# Patient Record
Sex: MALE | Race: WHITE | NOT HISPANIC OR LATINO | Employment: OTHER | URBAN - METROPOLITAN AREA
[De-identification: names, ages, dates, MRNs, and addresses within clinical notes are randomized per-mention and may not be internally consistent; named-entity substitution may affect disease eponyms.]

---

## 2018-07-08 ENCOUNTER — LAB REQUISITION (OUTPATIENT)
Dept: LAB | Facility: HOSPITAL | Age: 83
End: 2018-07-08

## 2018-07-08 DIAGNOSIS — Z00.00 ROUTINE GENERAL MEDICAL EXAMINATION AT A HEALTH CARE FACILITY: ICD-10-CM

## 2018-07-08 LAB
ANION GAP SERPL CALCULATED.3IONS-SCNC: 10 MMOL/L (ref 3–11)
BASOPHILS # BLD AUTO: 0.05 10*3/MM3 (ref 0–0.2)
BASOPHILS NFR BLD AUTO: 0.2 % (ref 0–1)
BUN BLD-MCNC: 66 MG/DL (ref 9–23)
BUN/CREAT SERPL: 18.1 (ref 7–25)
CALCIUM SPEC-SCNC: 7 MG/DL (ref 8.7–10.4)
CHLORIDE SERPL-SCNC: 111 MMOL/L (ref 99–109)
CLUMPED PLATELETS: PRESENT
CO2 SERPL-SCNC: 18 MMOL/L (ref 20–31)
CREAT BLD-MCNC: 3.64 MG/DL (ref 0.6–1.3)
DEPRECATED RDW RBC AUTO: 48.4 FL (ref 37–54)
EOSINOPHIL # BLD AUTO: 0.03 10*3/MM3 (ref 0–0.3)
EOSINOPHIL NFR BLD AUTO: 0.1 % (ref 0–3)
ERYTHROCYTE [DISTWIDTH] IN BLOOD BY AUTOMATED COUNT: 15.8 % (ref 11.3–14.5)
GFR SERPL CREATININE-BSD FRML MDRD: 16 ML/MIN/1.73
GLUCOSE BLD-MCNC: 212 MG/DL (ref 70–100)
HCT VFR BLD AUTO: 16.3 % (ref 38.9–50.9)
HGB BLD-MCNC: 5.1 G/DL (ref 13.1–17.5)
IMM GRANULOCYTES # BLD: 0.25 10*3/MM3 (ref 0–0.03)
IMM GRANULOCYTES NFR BLD: 1.2 % (ref 0–0.6)
LARGE PLATELETS: NORMAL
LYMPHOCYTES # BLD AUTO: 1.63 10*3/MM3 (ref 0.6–4.8)
LYMPHOCYTES NFR BLD AUTO: 7.6 % (ref 24–44)
MCH RBC QN AUTO: 26.7 PG (ref 27–31)
MCHC RBC AUTO-ENTMCNC: 31.3 G/DL (ref 32–36)
MCV RBC AUTO: 85.3 FL (ref 80–99)
MONOCYTES # BLD AUTO: 1.68 10*3/MM3 (ref 0–1)
MONOCYTES NFR BLD AUTO: 7.8 % (ref 0–12)
NEUTROPHILS # BLD AUTO: 18.12 10*3/MM3 (ref 1.5–8.3)
NEUTROPHILS NFR BLD AUTO: 84.3 % (ref 41–71)
PLATELET # BLD AUTO: 257 10*3/MM3 (ref 150–450)
PMV BLD AUTO: 9.7 FL (ref 6–12)
POTASSIUM BLD-SCNC: 4.1 MMOL/L (ref 3.5–5.5)
RBC # BLD AUTO: 1.91 10*6/MM3 (ref 4.2–5.76)
RBC MORPH BLD: NORMAL
SODIUM BLD-SCNC: 139 MMOL/L (ref 132–146)
WBC MORPH BLD: NORMAL
WBC NRBC COR # BLD: 21.51 10*3/MM3 (ref 3.5–10.8)

## 2018-07-08 PROCEDURE — 80048 BASIC METABOLIC PNL TOTAL CA: CPT

## 2018-07-08 PROCEDURE — 85025 COMPLETE CBC W/AUTO DIFF WBC: CPT

## 2018-07-08 PROCEDURE — 85007 BL SMEAR W/DIFF WBC COUNT: CPT

## 2018-08-27 ENCOUNTER — LAB REQUISITION (OUTPATIENT)
Dept: LAB | Facility: HOSPITAL | Age: 83
End: 2018-08-27

## 2018-08-27 DIAGNOSIS — Z00.00 ROUTINE GENERAL MEDICAL EXAMINATION AT A HEALTH CARE FACILITY: ICD-10-CM

## 2018-08-27 PROCEDURE — 85025 COMPLETE CBC W/AUTO DIFF WBC: CPT

## 2018-08-27 PROCEDURE — 80048 BASIC METABOLIC PNL TOTAL CA: CPT

## 2019-01-12 ENCOUNTER — HOSPITAL ENCOUNTER (INPATIENT)
Facility: HOSPITAL | Age: 84
LOS: 4 days | Discharge: HOME HEALTH | End: 2019-01-17
Attending: EMERGENCY MEDICINE | Admitting: INTERNAL MEDICINE

## 2019-01-12 ENCOUNTER — APPOINTMENT (OUTPATIENT)
Dept: CT IMAGING | Facility: HOSPITAL | Age: 84
End: 2019-01-12

## 2019-01-12 DIAGNOSIS — R50.9 ACUTE FEBRILE ILLNESS: ICD-10-CM

## 2019-01-12 DIAGNOSIS — Z78.9 IMPAIRED MOBILITY AND ADLS: ICD-10-CM

## 2019-01-12 DIAGNOSIS — Z74.09 IMPAIRED MOBILITY AND ADLS: ICD-10-CM

## 2019-01-12 DIAGNOSIS — Z74.09 IMPAIRED FUNCTIONAL MOBILITY, BALANCE, GAIT, AND ENDURANCE: ICD-10-CM

## 2019-01-12 DIAGNOSIS — K59.00 CONSTIPATION, UNSPECIFIED CONSTIPATION TYPE: ICD-10-CM

## 2019-01-12 DIAGNOSIS — L89.623 PRESSURE INJURY OF LEFT HEEL, STAGE 3 (HCC): ICD-10-CM

## 2019-01-12 DIAGNOSIS — N39.0 SEPSIS DUE TO URINARY TRACT INFECTION (HCC): Primary | ICD-10-CM

## 2019-01-12 DIAGNOSIS — R18.8 RETROPERITONEAL FLUID COLLECTION: ICD-10-CM

## 2019-01-12 DIAGNOSIS — A41.9 SEPSIS DUE TO URINARY TRACT INFECTION (HCC): Primary | ICD-10-CM

## 2019-01-12 DIAGNOSIS — J90 PLEURAL EFFUSION, RIGHT: ICD-10-CM

## 2019-01-12 PROBLEM — N17.9 ACUTE RENAL FAILURE SUPERIMPOSED ON STAGE 3 CHRONIC KIDNEY DISEASE (HCC): Status: ACTIVE | Noted: 2019-01-12

## 2019-01-12 PROBLEM — T14.8XXD WOUND HEALING, DELAYED: Status: ACTIVE | Noted: 2019-01-12

## 2019-01-12 PROBLEM — E83.51 HYPOCALCEMIA: Status: ACTIVE | Noted: 2019-01-12

## 2019-01-12 PROBLEM — I10 ESSENTIAL HYPERTENSION: Status: ACTIVE | Noted: 2019-01-12

## 2019-01-12 PROBLEM — N17.9 AKI (ACUTE KIDNEY INJURY) (HCC): Status: ACTIVE | Noted: 2019-01-12

## 2019-01-12 PROBLEM — N18.30 ACUTE RENAL FAILURE SUPERIMPOSED ON STAGE 3 CHRONIC KIDNEY DISEASE (HCC): Status: ACTIVE | Noted: 2019-01-12

## 2019-01-12 PROBLEM — E03.9 HYPOTHYROIDISM (ACQUIRED): Status: ACTIVE | Noted: 2019-01-12

## 2019-01-12 PROBLEM — R65.10 SIRS (SYSTEMIC INFLAMMATORY RESPONSE SYNDROME) (HCC): Status: ACTIVE | Noted: 2019-01-12

## 2019-01-12 PROBLEM — E11.9 TYPE 2 DIABETES MELLITUS (HCC): Status: ACTIVE | Noted: 2019-01-12

## 2019-01-12 LAB
ALBUMIN SERPL-MCNC: 3.26 G/DL (ref 3.2–4.8)
ALBUMIN/GLOB SERPL: 1.2 G/DL (ref 1.5–2.5)
ALP SERPL-CCNC: 74 U/L (ref 25–100)
ALT SERPL W P-5'-P-CCNC: 10 U/L (ref 7–40)
ANION GAP SERPL CALCULATED.3IONS-SCNC: 10 MMOL/L (ref 3–11)
AST SERPL-CCNC: 13 U/L (ref 0–33)
BACTERIA UR QL AUTO: ABNORMAL /HPF
BASOPHILS # BLD AUTO: 0.03 10*3/MM3 (ref 0–0.2)
BASOPHILS NFR BLD AUTO: 0.2 % (ref 0–1)
BILIRUB SERPL-MCNC: 0.2 MG/DL (ref 0.3–1.2)
BILIRUB UR QL STRIP: NEGATIVE
BUN BLD-MCNC: 86 MG/DL (ref 9–23)
BUN/CREAT SERPL: 36.1 (ref 7–25)
CALCIUM SPEC-SCNC: 8.4 MG/DL (ref 8.7–10.4)
CHLORIDE SERPL-SCNC: 104 MMOL/L (ref 99–109)
CLARITY UR: ABNORMAL
CO2 SERPL-SCNC: 18 MMOL/L (ref 20–31)
COLOR UR: YELLOW
CREAT BLD-MCNC: 2.38 MG/DL (ref 0.6–1.3)
D-LACTATE SERPL-SCNC: 2.3 MMOL/L (ref 0.5–2)
DEPRECATED RDW RBC AUTO: 48.2 FL (ref 37–54)
EOSINOPHIL # BLD AUTO: 0 10*3/MM3 (ref 0–0.3)
EOSINOPHIL NFR BLD AUTO: 0 % (ref 0–3)
ERYTHROCYTE [DISTWIDTH] IN BLOOD BY AUTOMATED COUNT: 15.5 % (ref 11.3–14.5)
FLUAV SUBTYP SPEC NAA+PROBE: NOT DETECTED
FLUBV RNA ISLT QL NAA+PROBE: NOT DETECTED
GFR SERPL CREATININE-BSD FRML MDRD: 26 ML/MIN/1.73
GLOBULIN UR ELPH-MCNC: 2.6 GM/DL
GLUCOSE BLD-MCNC: 170 MG/DL (ref 70–100)
GLUCOSE UR STRIP-MCNC: NEGATIVE MG/DL
HCT VFR BLD AUTO: 32.3 % (ref 38.9–50.9)
HGB BLD-MCNC: 10.6 G/DL (ref 13.1–17.5)
HGB UR QL STRIP.AUTO: ABNORMAL
HOLD SPECIMEN: NORMAL
HOLD SPECIMEN: NORMAL
HYALINE CASTS UR QL AUTO: ABNORMAL /LPF
IMM GRANULOCYTES # BLD AUTO: 0.06 10*3/MM3 (ref 0–0.03)
IMM GRANULOCYTES NFR BLD AUTO: 0.3 % (ref 0–0.6)
KETONES UR QL STRIP: NEGATIVE
LEUKOCYTE ESTERASE UR QL STRIP.AUTO: ABNORMAL
LYMPHOCYTES # BLD AUTO: 0.25 10*3/MM3 (ref 0.6–4.8)
LYMPHOCYTES NFR BLD AUTO: 1.4 % (ref 24–44)
MCH RBC QN AUTO: 28 PG (ref 27–31)
MCHC RBC AUTO-ENTMCNC: 32.8 G/DL (ref 32–36)
MCV RBC AUTO: 85.2 FL (ref 80–99)
MONOCYTES # BLD AUTO: 0.18 10*3/MM3 (ref 0–1)
MONOCYTES NFR BLD AUTO: 1 % (ref 0–12)
NEUTROPHILS # BLD AUTO: 17.74 10*3/MM3 (ref 1.5–8.3)
NEUTROPHILS NFR BLD AUTO: 97.4 % (ref 41–71)
NITRITE UR QL STRIP: NEGATIVE
PH UR STRIP.AUTO: <=5 [PH] (ref 5–8)
PLAT MORPH BLD: NORMAL
PLATELET # BLD AUTO: 270 10*3/MM3 (ref 150–450)
PMV BLD AUTO: 9.2 FL (ref 6–12)
POTASSIUM BLD-SCNC: 3.8 MMOL/L (ref 3.5–5.5)
PROCALCITONIN SERPL-MCNC: 3.14 NG/ML
PROT SERPL-MCNC: 5.9 G/DL (ref 5.7–8.2)
PROT UR QL STRIP: ABNORMAL
RBC # BLD AUTO: 3.79 10*6/MM3 (ref 4.2–5.76)
RBC # UR: ABNORMAL /HPF
RBC MORPH BLD: NORMAL
REF LAB TEST METHOD: ABNORMAL
SODIUM BLD-SCNC: 132 MMOL/L (ref 132–146)
SP GR UR STRIP: 1.02 (ref 1–1.03)
SQUAMOUS #/AREA URNS HPF: ABNORMAL /HPF
UROBILINOGEN UR QL STRIP: ABNORMAL
WBC MORPH BLD: NORMAL
WBC NRBC COR # BLD: 18.2 10*3/MM3 (ref 3.5–10.8)
WBC UR QL AUTO: ABNORMAL /HPF
WHOLE BLOOD HOLD SPECIMEN: NORMAL
WHOLE BLOOD HOLD SPECIMEN: NORMAL
YEAST URNS QL MICRO: ABNORMAL /HPF

## 2019-01-12 PROCEDURE — 83605 ASSAY OF LACTIC ACID: CPT | Performed by: EMERGENCY MEDICINE

## 2019-01-12 PROCEDURE — P9612 CATHETERIZE FOR URINE SPEC: HCPCS

## 2019-01-12 PROCEDURE — 80053 COMPREHEN METABOLIC PANEL: CPT | Performed by: EMERGENCY MEDICINE

## 2019-01-12 PROCEDURE — 81001 URINALYSIS AUTO W/SCOPE: CPT | Performed by: EMERGENCY MEDICINE

## 2019-01-12 PROCEDURE — 25010000002 VANCOMYCIN 10 G RECONSTITUTED SOLUTION: Performed by: EMERGENCY MEDICINE

## 2019-01-12 PROCEDURE — 85025 COMPLETE CBC W/AUTO DIFF WBC: CPT | Performed by: EMERGENCY MEDICINE

## 2019-01-12 PROCEDURE — 99223 1ST HOSP IP/OBS HIGH 75: CPT | Performed by: INTERNAL MEDICINE

## 2019-01-12 PROCEDURE — 87502 INFLUENZA DNA AMP PROBE: CPT | Performed by: EMERGENCY MEDICINE

## 2019-01-12 PROCEDURE — 84145 PROCALCITONIN (PCT): CPT | Performed by: EMERGENCY MEDICINE

## 2019-01-12 PROCEDURE — 25010000002 ONDANSETRON PER 1 MG

## 2019-01-12 PROCEDURE — 87040 BLOOD CULTURE FOR BACTERIA: CPT | Performed by: EMERGENCY MEDICINE

## 2019-01-12 PROCEDURE — 36415 COLL VENOUS BLD VENIPUNCTURE: CPT | Performed by: EMERGENCY MEDICINE

## 2019-01-12 PROCEDURE — 25010000002 PIPERACILLIN SOD-TAZOBACTAM PER 1 G: Performed by: EMERGENCY MEDICINE

## 2019-01-12 PROCEDURE — 85007 BL SMEAR W/DIFF WBC COUNT: CPT | Performed by: EMERGENCY MEDICINE

## 2019-01-12 PROCEDURE — 87086 URINE CULTURE/COLONY COUNT: CPT | Performed by: EMERGENCY MEDICINE

## 2019-01-12 PROCEDURE — 99285 EMERGENCY DEPT VISIT HI MDM: CPT

## 2019-01-12 PROCEDURE — 74176 CT ABD & PELVIS W/O CONTRAST: CPT

## 2019-01-12 RX ORDER — SODIUM CHLORIDE 0.9 % (FLUSH) 0.9 %
10 SYRINGE (ML) INJECTION AS NEEDED
Status: DISCONTINUED | OUTPATIENT
Start: 2019-01-12 | End: 2019-01-17 | Stop reason: HOSPADM

## 2019-01-12 RX ORDER — ONDANSETRON 2 MG/ML
INJECTION INTRAMUSCULAR; INTRAVENOUS
Status: COMPLETED
Start: 2019-01-12 | End: 2019-01-12

## 2019-01-12 RX ORDER — LEVOTHYROXINE SODIUM 0.05 MG/1
50 TABLET ORAL DAILY
COMMUNITY

## 2019-01-12 RX ORDER — HYDROXYZINE HYDROCHLORIDE 25 MG/1
25 TABLET, FILM COATED ORAL 2 TIMES DAILY
COMMUNITY

## 2019-01-12 RX ORDER — PHENOL 1.4 %
AEROSOL, SPRAY (ML) MUCOUS MEMBRANE NIGHTLY
COMMUNITY

## 2019-01-12 RX ORDER — METRONIDAZOLE 500 MG/1
500 TABLET ORAL 3 TIMES DAILY
COMMUNITY
End: 2019-01-17 | Stop reason: HOSPADM

## 2019-01-12 RX ORDER — ACETAMINOPHEN 500 MG
1000 TABLET ORAL ONCE
Status: COMPLETED | OUTPATIENT
Start: 2019-01-12 | End: 2019-01-12

## 2019-01-12 RX ORDER — TAMSULOSIN HYDROCHLORIDE 0.4 MG/1
1 CAPSULE ORAL NIGHTLY
COMMUNITY

## 2019-01-12 RX ORDER — SULFAMETHOXAZOLE AND TRIMETHOPRIM 800; 160 MG/1; MG/1
1 TABLET ORAL 2 TIMES DAILY
COMMUNITY
End: 2019-01-17 | Stop reason: HOSPADM

## 2019-01-12 RX ORDER — ONDANSETRON 2 MG/ML
4 INJECTION INTRAMUSCULAR; INTRAVENOUS ONCE
Status: COMPLETED | OUTPATIENT
Start: 2019-01-12 | End: 2019-01-12

## 2019-01-12 RX ORDER — NIFEDIPINE 20 MG/1
20 CAPSULE ORAL DAILY
COMMUNITY
End: 2019-01-17 | Stop reason: HOSPADM

## 2019-01-12 RX ADMIN — TAZOBACTAM SODIUM AND PIPERACILLIN SODIUM 3.38 G: 375; 3 INJECTION, SOLUTION INTRAVENOUS at 19:22

## 2019-01-12 RX ADMIN — ONDANSETRON 4 MG: 2 INJECTION INTRAMUSCULAR; INTRAVENOUS at 19:14

## 2019-01-12 RX ADMIN — VANCOMYCIN HYDROCHLORIDE 2250 MG: 10 INJECTION, POWDER, LYOPHILIZED, FOR SOLUTION INTRAVENOUS at 20:36

## 2019-01-12 RX ADMIN — SODIUM CHLORIDE, POTASSIUM CHLORIDE, SODIUM LACTATE AND CALCIUM CHLORIDE 2328 ML: 600; 310; 30; 20 INJECTION, SOLUTION INTRAVENOUS at 20:39

## 2019-01-12 RX ADMIN — ACETAMINOPHEN 1000 MG: 500 TABLET, FILM COATED ORAL at 19:12

## 2019-01-12 NOTE — ED PROVIDER NOTES
"Subjective   Leo Elias is a 83 y.o. male who presents to the emergency department with complaints of fever of 103 that occurred today. Wife mentions that the patient had 3 episodes of vomiting since 0300 that appeared dark in color. The patient has abdominal pain. The patient has not had a bowel movement in 2 weeks, the patients wife gave him a suppository 1 hour ago that did not work. The patient has a wound on his left heel that started in 10/2018 home health is coming and taking care of his wound. He is currently taking Flagyl for his wound.The patient started Bactrim yesterday for cloudy urine. He has not had any Tylenol or Motrin for his fever. He has a PSHx of appendectomy. He denies any SOA, cough, congestion, and any other acute symptoms at this time.         History provided by:  Patient  Fever   Max temp prior to arrival:  103  Temp source:  Axillary  Severity:  Moderate  Onset quality:  Sudden  Duration:  1 day  Timing:  Constant  Progression:  Unchanged  Chronicity:  New  Relieved by:  Nothing  Worsened by:  Nothing  Ineffective treatments: Suppository   Associated symptoms: nausea and vomiting    Associated symptoms: no congestion, no cough and no diarrhea        Review of Systems   Constitutional: Positive for fever.   HENT: Negative for congestion.    Respiratory: Negative for cough and shortness of breath.    Gastrointestinal: Positive for nausea and vomiting. Negative for diarrhea.   Skin: Positive for wound.   All other systems reviewed and are negative.      Past Medical History:   Diagnosis Date   • Diabetes mellitus (CMS/HCC)    • Disease of thyroid gland    • Hypertension    • Renal disorder        No Known Allergies    Past Surgical History:   Procedure Laterality Date   • KNEE SURGERY      \"septic knee\"       History reviewed. No pertinent family history.    Social History     Socioeconomic History   • Marital status:      Spouse name: Not on file   • Number of children: Not on file "   • Years of education: Not on file   • Highest education level: Not on file   Tobacco Use   • Smoking status: Never Smoker   • Smokeless tobacco: Never Used   Substance and Sexual Activity   • Alcohol use: No     Frequency: Never         Objective   Physical Exam   Constitutional: He is oriented to person, place, and time. He appears well-developed and well-nourished. No distress.   HENT:   Head: Normocephalic and atraumatic.   Eyes: Conjunctivae are normal. No scleral icterus.   Neck: Normal range of motion. Neck supple.   Cardiovascular: Regular rhythm, normal heart sounds and intact distal pulses.   The patient has a irregular heart rate.    Pulmonary/Chest: Effort normal and breath sounds normal. No respiratory distress. He has no wheezes. He has no rales.   Abdominal: Soft. There is no tenderness. There is no guarding.   The patient has hypoactive bowel sounds    Musculoskeletal: Normal range of motion.   Neurological: He is alert and oriented to person, place, and time.   Skin: Skin is warm and dry. He is not diaphoretic.   Psychiatric: He has a normal mood and affect. His behavior is normal.   Nursing note and vitals reviewed.      Critical Care  Performed by: Jacky Dubois MD  Authorized by: Jacky Dubois MD     Critical care provider statement:     Critical care time (minutes):  60    Critical care time was exclusive of:  Separately billable procedures and treating other patients    Critical care was necessary to treat or prevent imminent or life-threatening deterioration of the following conditions:  Sepsis    Critical care was time spent personally by me on the following activities:  Discussions with consultants, development of treatment plan with patient or surrogate, examination of patient, review of old charts, ordering and review of laboratory studies, ordering and review of radiographic studies, ordering and performing treatments and interventions, re-evaluation of patient's  condition, obtaining history from patient or surrogate, pulse oximetry and evaluation of patient's response to treatment               ED Course  ED Course as of Jan 12 2344   Sat Jan 12, 2019 1842 Temp: (!) 103 °F (39.4 °C) [RS]   1842 Heart Rate: 119 [RS]   1926 Lactate: (!!) 2.3 [RS]   1926 Creatinine: (!) 2.38 [RS]   1926 WBC: (!) 18.20 [RS]   1926 Procalcitonin: (!) 3.14 [RS]   1956 Leuk Esterase, UA: (!) Large (3+) [RS]   1956 WBC, UA: (!) Too Numerous to Count [RS]   1956 Bacteria, UA: (!) 2+ [RS]   2159 Patient has numerous findings on imaging scan.  Patient has sepsis likely related to urinary tract infection as well as this unexplained fluid collection the retroperitoneal region.  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  He also has renal insufficiency.  Patient's long-term prognosis is very poor.  We will admit for further management.  I discussed these findings with the patient and wife.  They voice understanding and agree.  Hospitalist paged for admission.  [RS]   2232 Case discussed with the hospitalist agrees with plan for admission.  [RS]      ED Course User Index  [RS] Jacky Dubois MD     Recent Results (from the past 24 hour(s))   Comprehensive Metabolic Panel    Collection Time: 01/12/19  6:27 PM   Result Value Ref Range    Glucose 170 (H) 70 - 100 mg/dL    BUN 86 (H) 9 - 23 mg/dL    Creatinine 2.38 (H) 0.60 - 1.30 mg/dL    Sodium 132 132 - 146 mmol/L    Potassium 3.8 3.5 - 5.5 mmol/L    Chloride 104 99 - 109 mmol/L    CO2 18.0 (L) 20.0 - 31.0 mmol/L    Calcium 8.4 (L) 8.7 - 10.4 mg/dL    Total Protein 5.9 5.7 - 8.2 g/dL    Albumin 3.26 3.20 - 4.80 g/dL    ALT (SGPT) 10 7 - 40 U/L    AST (SGOT) 13 0 - 33 U/L    Alkaline Phosphatase 74 25 - 100 U/L    Total Bilirubin 0.2 (L) 0.3 - 1.2 mg/dL    eGFR Non African Amer 26 (L) >60 mL/min/1.73    Globulin 2.6 gm/dL    A/G Ratio 1.2 (L) 1.5 - 2.5 g/dL    BUN/Creatinine Ratio 36.1 (H) 7.0 - 25.0    Anion Gap 10.0 3.0 - 11.0 mmol/L    Lactic Acid, Plasma    Collection Time: 01/12/19  6:27 PM   Result Value Ref Range    Lactate 2.3 (C) 0.5 - 2.0 mmol/L   Procalcitonin    Collection Time: 01/12/19  6:27 PM   Result Value Ref Range    Procalcitonin 3.14 (H) <=0.25 ng/mL   CBC Auto Differential    Collection Time: 01/12/19  6:27 PM   Result Value Ref Range    WBC 18.20 (H) 3.50 - 10.80 10*3/mm3    RBC 3.79 (L) 4.20 - 5.76 10*6/mm3    Hemoglobin 10.6 (L) 13.1 - 17.5 g/dL    Hematocrit 32.3 (L) 38.9 - 50.9 %    MCV 85.2 80.0 - 99.0 fL    MCH 28.0 27.0 - 31.0 pg    MCHC 32.8 32.0 - 36.0 g/dL    RDW 15.5 (H) 11.3 - 14.5 %    RDW-SD 48.2 37.0 - 54.0 fl    MPV 9.2 6.0 - 12.0 fL    Platelets 270 150 - 450 10*3/mm3    Neutrophil % 97.4 (H) 41.0 - 71.0 %    Lymphocyte % 1.4 (L) 24.0 - 44.0 %    Monocyte % 1.0 0.0 - 12.0 %    Eosinophil % 0.0 0.0 - 3.0 %    Basophil % 0.2 0.0 - 1.0 %    Immature Grans % 0.3 0.0 - 0.6 %    Neutrophils, Absolute 17.74 (H) 1.50 - 8.30 10*3/mm3    Lymphocytes, Absolute 0.25 (L) 0.60 - 4.80 10*3/mm3    Monocytes, Absolute 0.18 0.00 - 1.00 10*3/mm3    Eosinophils, Absolute 0.00 0.00 - 0.30 10*3/mm3    Basophils, Absolute 0.03 0.00 - 0.20 10*3/mm3    Immature Grans, Absolute 0.06 (H) 0.00 - 0.03 10*3/mm3   Light Blue Top    Collection Time: 01/12/19  6:27 PM   Result Value Ref Range    Extra Tube hold for add-on    Green Top (Gel)    Collection Time: 01/12/19  6:27 PM   Result Value Ref Range    Extra Tube Hold for add-ons.    Lavender Top    Collection Time: 01/12/19  6:27 PM   Result Value Ref Range    Extra Tube hold for add-on    Lactic Acid, Reflex Timer (This will reflex a repeat order 3-3:15 hours after ordered.)    Collection Time: 01/12/19  6:27 PM   Result Value Ref Range    Extra Tube Hold for add-ons.    Scan Slide    Collection Time: 01/12/19  6:27 PM   Result Value Ref Range    RBC Morphology Normal Normal    WBC Morphology Normal Normal    Platelet Morphology Normal Normal   Influenza A & B, RT PCR - Swab,  Nasopharynx    Collection Time: 01/12/19  6:42 PM   Result Value Ref Range    Influenza A PCR Not Detected Not Detected    Influenza B PCR Not Detected Not Detected   Urinalysis With Culture If Indicated - Urine, Catheter In/Out    Collection Time: 01/12/19  7:21 PM   Result Value Ref Range    Color, UA Yellow Yellow, Straw    Appearance, UA Turbid (A) Clear    pH, UA <=5.0 5.0 - 8.0    Specific Gravity, UA 1.016 1.001 - 1.030    Glucose, UA Negative Negative    Ketones, UA Negative Negative    Bilirubin, UA Negative Negative    Blood, UA Small (1+) (A) Negative    Protein,  mg/dL (2+) (A) Negative    Leuk Esterase, UA Large (3+) (A) Negative    Nitrite, UA Negative Negative    Urobilinogen, UA 0.2 E.U./dL 0.2 - 1.0 E.U./dL   Urinalysis, Microscopic Only - Urine, Catheter In/Out    Collection Time: 01/12/19  7:21 PM   Result Value Ref Range    RBC, UA 0-2 None Seen, 0-2 /HPF    WBC, UA Too Numerous to Count (A) None Seen, 0-2 /HPF    Bacteria, UA 2+ (A) None Seen, Trace /HPF    Squamous Epithelial Cells, UA 0-2 None Seen, 0-2 /HPF    Yeast, UA Moderate/2+ Budding Yeast None Seen /HPF    Hyaline Casts, UA None Seen 0 - 6 /LPF    Methodology Manual Light Microscopy      Note: In addition to lab results from this visit, the labs listed above may include labs taken at another facility or during a different encounter within the last 24 hours. Please correlate lab times with ED admission and discharge times for further clarification of the services performed during this visit.    CT Abdomen Pelvis Without Contrast   Final Result   Addendum 1 of 1   Note: Critical Findings were discussed with Jacky Roy at 1/12/2019    9:49    PM EST. The report was understood and acknowledged by the healthcare    giver, who    stated patient has elevated WBCs, no history of recent fall or    anticoagulants.      THIS DOCUMENT HAS BEEN ELECTRONICALLY SIGNED BY HOLDEN BACON MD      Final   1. CAD.    2. Moderate right  pleural effusion.    3. Bibasilar subsegmental atelectasis. Mild RLL compressive atelectasis and/or    infiltrate.    4. Constipation with mild rectal stercoral colitis.    5. Prostate enlargement.    6. Small hiatal hernia.    7. Pancreatic atrophy.    8. Nonspecific bowel gas pattern.    9. Right retroperitoneal 3.9 cm x 6.7 cm x 9.1 cm oval abnormality, possible    fluid collection such as hematoma or abscess. See above.    10. Mild anasarca. Slightly more prominent edema or contusion in right flank    subcutaneous fat.    11.  Suspect mild swelling of the right psoas muscle.   12.  Additional findings, as above.       THIS DOCUMENT HAS BEEN ELECTRONICALLY SIGNED BY HOLDEN BACON MD        Vitals:    01/12/19 2200 01/12/19 2201 01/12/19 2300 01/12/19 2326   BP: 122/59  103/54    Pulse:  75 76    Resp:       Temp:    99.8 °F (37.7 °C)   TempSrc:    Oral   SpO2:  96% 96%    Weight:       Height:         Medications   sodium chloride 0.9 % flush 10 mL (not administered)   piperacillin-tazobactam (ZOSYN) 3.375 g in iso-osmotic dextrose 50 ml (premix) (0 g Intravenous Stopped 1/12/19 2033)   vancomycin 2250 mg/500 mL 0.9% NS IVPB ([facility]) (0 mg Intravenous Stopped 1/12/19 2305)   acetaminophen (TYLENOL) tablet 1,000 mg (1,000 mg Oral Given 1/12/19 1912)   ondansetron (ZOFRAN) injection 4 mg (4 mg Intravenous Given 1/12/19 1914)   lactated ringers - IBW for BMI > 30 bolus 2,328 mL (2,328 mL Intravenous New Bag 1/12/19 2039)     ECG/EMG Results (last 24 hours)     ** No results found for the last 24 hours. **                        MDM  Number of Diagnoses or Management Options  Acute febrile illness:   Constipation, unspecified constipation type:   Pleural effusion, right:   Pressure injury of left heel, stage 3 (CMS/HCC):   Retroperitoneal fluid collection:   Sepsis due to urinary tract infection (CMS/HCC):      Amount and/or Complexity of Data Reviewed  Clinical lab tests: reviewed  Tests in the radiology section of  CPT®: reviewed  Decide to obtain previous medical records or to obtain history from someone other than the patient: yes  Obtain history from someone other than the patient: yes  Review and summarize past medical records: yes  Discuss the patient with other providers: yes  Independent visualization of images, tracings, or specimens: yes    Critical Care  Total time providing critical care: 30-74 minutes      Final diagnoses:   Sepsis due to urinary tract infection (CMS/HCC)   Retroperitoneal fluid collection   Pleural effusion, right   Pressure injury of left heel, stage 3 (CMS/HCC)   Constipation, unspecified constipation type   Acute febrile illness       Documentation assistance provided by don Lopez.  Information recorded by the scribe was done at my direction and has been verified and validated by me.     Karen Lopez  01/12/19 6709       Karen Lopez  01/12/19 1669       Jacky Dubois MD  01/12/19 2326

## 2019-01-13 ENCOUNTER — APPOINTMENT (OUTPATIENT)
Dept: CT IMAGING | Facility: HOSPITAL | Age: 84
End: 2019-01-13

## 2019-01-13 LAB
ANION GAP SERPL CALCULATED.3IONS-SCNC: 9 MMOL/L (ref 3–11)
BASOPHILS # BLD AUTO: 0.02 10*3/MM3 (ref 0–0.2)
BASOPHILS NFR BLD AUTO: 0.2 % (ref 0–1)
BUN BLD-MCNC: 80 MG/DL (ref 9–23)
BUN/CREAT SERPL: 34.6 (ref 7–25)
CALCIUM SPEC-SCNC: 8.2 MG/DL (ref 8.7–10.4)
CHLORIDE SERPL-SCNC: 106 MMOL/L (ref 99–109)
CO2 SERPL-SCNC: 18 MMOL/L (ref 20–31)
CREAT BLD-MCNC: 2.31 MG/DL (ref 0.6–1.3)
CREAT UR-MCNC: 34.6 MG/DL
D-LACTATE SERPL-SCNC: 1.6 MMOL/L (ref 0.5–2)
DEPRECATED RDW RBC AUTO: 49.1 FL (ref 37–54)
EOSINOPHIL # BLD AUTO: 0.02 10*3/MM3 (ref 0–0.3)
EOSINOPHIL NFR BLD AUTO: 0.2 % (ref 0–3)
EOSINOPHIL SPEC QL MICRO: 0 % EOS/100 CELLS (ref 0–0)
ERYTHROCYTE [DISTWIDTH] IN BLOOD BY AUTOMATED COUNT: 15.7 % (ref 11.3–14.5)
GFR SERPL CREATININE-BSD FRML MDRD: 27 ML/MIN/1.73
GLUCOSE BLD-MCNC: 95 MG/DL (ref 70–100)
GLUCOSE BLDC GLUCOMTR-MCNC: 103 MG/DL (ref 70–130)
GLUCOSE BLDC GLUCOMTR-MCNC: 104 MG/DL (ref 70–130)
GLUCOSE BLDC GLUCOMTR-MCNC: 113 MG/DL (ref 70–130)
GLUCOSE BLDC GLUCOMTR-MCNC: 79 MG/DL (ref 70–130)
GLUCOSE BLDC GLUCOMTR-MCNC: 82 MG/DL (ref 70–130)
GLUCOSE BLDC GLUCOMTR-MCNC: 91 MG/DL (ref 70–130)
HBA1C MFR BLD: 5.6 % (ref 4.8–5.6)
HCT VFR BLD AUTO: 30.7 % (ref 38.9–50.9)
HGB BLD-MCNC: 10 G/DL (ref 13.1–17.5)
IMM GRANULOCYTES # BLD AUTO: 0.02 10*3/MM3 (ref 0–0.03)
IMM GRANULOCYTES NFR BLD AUTO: 0.2 % (ref 0–0.6)
LYMPHOCYTES # BLD AUTO: 0.21 10*3/MM3 (ref 0.6–4.8)
LYMPHOCYTES NFR BLD AUTO: 1.7 % (ref 24–44)
MCH RBC QN AUTO: 27.9 PG (ref 27–31)
MCHC RBC AUTO-ENTMCNC: 32.6 G/DL (ref 32–36)
MCV RBC AUTO: 85.5 FL (ref 80–99)
MONOCYTES # BLD AUTO: 0.17 10*3/MM3 (ref 0–1)
MONOCYTES NFR BLD AUTO: 1.4 % (ref 0–12)
NEUTROPHILS # BLD AUTO: 11.88 10*3/MM3 (ref 1.5–8.3)
NEUTROPHILS NFR BLD AUTO: 96.5 % (ref 41–71)
PLATELET # BLD AUTO: 221 10*3/MM3 (ref 150–450)
PMV BLD AUTO: 9.2 FL (ref 6–12)
POTASSIUM BLD-SCNC: 4 MMOL/L (ref 3.5–5.5)
PROT UR-MCNC: 105 MG/DL (ref 1–14)
RBC # BLD AUTO: 3.59 10*6/MM3 (ref 4.2–5.76)
SODIUM BLD-SCNC: 133 MMOL/L (ref 132–146)
SODIUM UR-SCNC: 24 MMOL/L (ref 30–90)
WBC NRBC COR # BLD: 12.3 10*3/MM3 (ref 3.5–10.8)

## 2019-01-13 PROCEDURE — 25010000002 PIPERACILLIN SOD-TAZOBACTAM PER 1 G: Performed by: NURSE PRACTITIONER

## 2019-01-13 PROCEDURE — A9270 NON-COVERED ITEM OR SERVICE: HCPCS | Performed by: NURSE PRACTITIONER

## 2019-01-13 PROCEDURE — 63710000001 HYDROXYZINE 25 MG TABLET: Performed by: NURSE PRACTITIONER

## 2019-01-13 PROCEDURE — 63710000001 TAMSULOSIN 0.4 MG CAPSULE: Performed by: HOSPITALIST

## 2019-01-13 PROCEDURE — 83605 ASSAY OF LACTIC ACID: CPT | Performed by: EMERGENCY MEDICINE

## 2019-01-13 PROCEDURE — 63710000001 TAMSULOSIN 0.4 MG CAPSULE: Performed by: NURSE PRACTITIONER

## 2019-01-13 PROCEDURE — 63710000001 MELATONIN 5 MG SUBLINGUAL TABLET: Performed by: NURSE PRACTITIONER

## 2019-01-13 PROCEDURE — 97162 PT EVAL MOD COMPLEX 30 MIN: CPT | Performed by: PHYSICAL THERAPIST

## 2019-01-13 PROCEDURE — 82962 GLUCOSE BLOOD TEST: CPT

## 2019-01-13 PROCEDURE — 84300 ASSAY OF URINE SODIUM: CPT | Performed by: NURSE PRACTITIONER

## 2019-01-13 PROCEDURE — 85025 COMPLETE CBC W/AUTO DIFF WBC: CPT | Performed by: NURSE PRACTITIONER

## 2019-01-13 PROCEDURE — 86335 IMMUNFIX E-PHORSIS/URINE/CSF: CPT | Performed by: NURSE PRACTITIONER

## 2019-01-13 PROCEDURE — 80048 BASIC METABOLIC PNL TOTAL CA: CPT | Performed by: NURSE PRACTITIONER

## 2019-01-13 PROCEDURE — 63710000001 INSULIN LISPRO (HUMAN) PER 5 UNITS: Performed by: NURSE PRACTITIONER

## 2019-01-13 PROCEDURE — 97530 THERAPEUTIC ACTIVITIES: CPT

## 2019-01-13 PROCEDURE — 63710000001 LACTOBACILLUS ACIDOPHILUS CAPSULE: Performed by: NURSE PRACTITIONER

## 2019-01-13 PROCEDURE — 83036 HEMOGLOBIN GLYCOSYLATED A1C: CPT | Performed by: NURSE PRACTITIONER

## 2019-01-13 PROCEDURE — 25010000002 HEPARIN (PORCINE) PER 1000 UNITS: Performed by: NURSE PRACTITIONER

## 2019-01-13 PROCEDURE — A9270 NON-COVERED ITEM OR SERVICE: HCPCS | Performed by: HOSPITALIST

## 2019-01-13 PROCEDURE — 82570 ASSAY OF URINE CREATININE: CPT | Performed by: NURSE PRACTITIONER

## 2019-01-13 PROCEDURE — 87205 SMEAR GRAM STAIN: CPT | Performed by: NURSE PRACTITIONER

## 2019-01-13 PROCEDURE — 84156 ASSAY OF PROTEIN URINE: CPT | Performed by: NURSE PRACTITIONER

## 2019-01-13 PROCEDURE — 25010000002 ONDANSETRON PER 1 MG: Performed by: NURSE PRACTITIONER

## 2019-01-13 PROCEDURE — 97166 OT EVAL MOD COMPLEX 45 MIN: CPT

## 2019-01-13 PROCEDURE — 73700 CT LOWER EXTREMITY W/O DYE: CPT

## 2019-01-13 PROCEDURE — 99233 SBSQ HOSP IP/OBS HIGH 50: CPT | Performed by: HOSPITALIST

## 2019-01-13 RX ORDER — TAMSULOSIN HYDROCHLORIDE 0.4 MG/1
0.8 CAPSULE ORAL NIGHTLY
Status: DISCONTINUED | OUTPATIENT
Start: 2019-01-13 | End: 2019-01-17 | Stop reason: HOSPADM

## 2019-01-13 RX ORDER — SODIUM CHLORIDE 0.9 % (FLUSH) 0.9 %
3 SYRINGE (ML) INJECTION EVERY 12 HOURS SCHEDULED
Status: DISCONTINUED | OUTPATIENT
Start: 2019-01-13 | End: 2019-01-17 | Stop reason: HOSPADM

## 2019-01-13 RX ORDER — LEVOTHYROXINE SODIUM 0.05 MG/1
50 TABLET ORAL DAILY
Status: DISCONTINUED | OUTPATIENT
Start: 2019-01-13 | End: 2019-01-17 | Stop reason: HOSPADM

## 2019-01-13 RX ORDER — L.ACID,PARA/B.BIFIDUM/S.THERM 8B CELL
1 CAPSULE ORAL DAILY
Status: DISCONTINUED | OUTPATIENT
Start: 2019-01-13 | End: 2019-01-17 | Stop reason: HOSPADM

## 2019-01-13 RX ORDER — TAMSULOSIN HYDROCHLORIDE 0.4 MG/1
0.4 CAPSULE ORAL NIGHTLY
Status: DISCONTINUED | OUTPATIENT
Start: 2019-01-13 | End: 2019-01-13

## 2019-01-13 RX ORDER — ONDANSETRON 2 MG/ML
4 INJECTION INTRAMUSCULAR; INTRAVENOUS EVERY 6 HOURS PRN
Status: DISCONTINUED | OUTPATIENT
Start: 2019-01-13 | End: 2019-01-17 | Stop reason: HOSPADM

## 2019-01-13 RX ORDER — SODIUM CHLORIDE 0.9 % (FLUSH) 0.9 %
3-10 SYRINGE (ML) INJECTION AS NEEDED
Status: DISCONTINUED | OUTPATIENT
Start: 2019-01-13 | End: 2019-01-17 | Stop reason: HOSPADM

## 2019-01-13 RX ORDER — NICOTINE POLACRILEX 4 MG
15 LOZENGE BUCCAL
Status: DISCONTINUED | OUTPATIENT
Start: 2019-01-13 | End: 2019-01-17 | Stop reason: HOSPADM

## 2019-01-13 RX ORDER — HYDROXYZINE HYDROCHLORIDE 25 MG/1
25 TABLET, FILM COATED ORAL 2 TIMES DAILY
Status: DISCONTINUED | OUTPATIENT
Start: 2019-01-13 | End: 2019-01-17 | Stop reason: HOSPADM

## 2019-01-13 RX ORDER — NIFEDIPINE 10 MG/1
20 CAPSULE ORAL DAILY
Status: DISCONTINUED | OUTPATIENT
Start: 2019-01-13 | End: 2019-01-13

## 2019-01-13 RX ORDER — DEXTROSE MONOHYDRATE 25 G/50ML
25 INJECTION, SOLUTION INTRAVENOUS
Status: DISCONTINUED | OUTPATIENT
Start: 2019-01-13 | End: 2019-01-17 | Stop reason: HOSPADM

## 2019-01-13 RX ORDER — SODIUM CHLORIDE 9 MG/ML
100 INJECTION, SOLUTION INTRAVENOUS CONTINUOUS
Status: DISCONTINUED | OUTPATIENT
Start: 2019-01-13 | End: 2019-01-17 | Stop reason: HOSPADM

## 2019-01-13 RX ORDER — HEPARIN SODIUM 5000 [USP'U]/ML
5000 INJECTION, SOLUTION INTRAVENOUS; SUBCUTANEOUS EVERY 12 HOURS SCHEDULED
Status: DISCONTINUED | OUTPATIENT
Start: 2019-01-13 | End: 2019-01-17 | Stop reason: HOSPADM

## 2019-01-13 RX ADMIN — HYDROXYZINE HYDROCHLORIDE 25 MG: 25 TABLET, FILM COATED ORAL at 09:22

## 2019-01-13 RX ADMIN — SODIUM CHLORIDE 100 ML/HR: 9 INJECTION, SOLUTION INTRAVENOUS at 17:38

## 2019-01-13 RX ADMIN — TAZOBACTAM SODIUM AND PIPERACILLIN SODIUM 3.38 G: 375; 3 INJECTION, SOLUTION INTRAVENOUS at 09:44

## 2019-01-13 RX ADMIN — HYDROXYZINE HYDROCHLORIDE 25 MG: 25 TABLET, FILM COATED ORAL at 21:30

## 2019-01-13 RX ADMIN — TAMSULOSIN HYDROCHLORIDE 0.4 MG: 0.4 CAPSULE ORAL at 01:37

## 2019-01-13 RX ADMIN — Medication 5 MG: at 01:37

## 2019-01-13 RX ADMIN — Medication 1 CAPSULE: at 09:22

## 2019-01-13 RX ADMIN — HEPARIN SODIUM 5000 UNITS: 5000 INJECTION INTRAVENOUS; SUBCUTANEOUS at 21:30

## 2019-01-13 RX ADMIN — SODIUM CHLORIDE, PRESERVATIVE FREE 3 ML: 5 INJECTION INTRAVENOUS at 01:37

## 2019-01-13 RX ADMIN — TAZOBACTAM SODIUM AND PIPERACILLIN SODIUM 3.38 G: 375; 3 INJECTION, SOLUTION INTRAVENOUS at 17:37

## 2019-01-13 RX ADMIN — ONDANSETRON 4 MG: 2 INJECTION INTRAMUSCULAR; INTRAVENOUS at 05:39

## 2019-01-13 RX ADMIN — SODIUM CHLORIDE 100 ML/HR: 9 INJECTION, SOLUTION INTRAVENOUS at 01:37

## 2019-01-13 RX ADMIN — SODIUM CHLORIDE 500 ML: 9 INJECTION, SOLUTION INTRAVENOUS at 09:43

## 2019-01-13 RX ADMIN — HEPARIN SODIUM 5000 UNITS: 5000 INJECTION INTRAVENOUS; SUBCUTANEOUS at 09:23

## 2019-01-13 RX ADMIN — TAMSULOSIN HYDROCHLORIDE 0.8 MG: 0.4 CAPSULE ORAL at 21:30

## 2019-01-13 RX ADMIN — TAZOBACTAM SODIUM AND PIPERACILLIN SODIUM 3.38 G: 375; 3 INJECTION, SOLUTION INTRAVENOUS at 02:00

## 2019-01-13 NOTE — THERAPY EVALUATION
"Acute Care - Physical Therapy Initial Evaluation  [facility]     Patient Name: Leo Elias  : 1935  MRN: [id number]  Today's Date: 2019   Onset of Illness/Injury or Date of Surgery: 19  Date of Referral to PT: 19  Referring Physician: HOLLY Zhang      Admit Date: 2019    Visit Dx:     ICD-10-CM ICD-9-CM   1. Sepsis due to urinary tract infection (CMS/HCC) A41.9 038.9    N39.0 995.91     599.0   2. Retroperitoneal fluid collection R18.8 789.59   3. Pleural effusion, right J90 511.9   4. Pressure injury of left heel, stage 3 (CMS/HCC) L89.623 707.07     707.23   5. Constipation, unspecified constipation type K59.00 564.00   6. Acute febrile illness R50.9 780.60   7. Impaired mobility and ADLs Z74.09 799.89   8. Impaired functional mobility, balance, gait, and endurance Z74.09 V49.89     Patient Active Problem List   Diagnosis   • Sepsis, unspecified organism (CMS/HCC)   • Hypocalcemia   • Acute renal failure superimposed on stage 3 chronic kidney disease (CMS/HCC)   • Sepsis due to urinary tract infection (CMS/HCC)   • Type 2 diabetes mellitus (CMS/HCC)   • Hypothyroidism (acquired)   • Essential hypertension   • Wound healing, delayed, left foot   • Acute UTI (urinary tract infection)     Past Medical History:   Diagnosis Date   • Diabetes mellitus (CMS/HCC)    • Disease of thyroid gland    • Hypertension    • Renal disorder      Past Surgical History:   Procedure Laterality Date   • KNEE SURGERY      \"septic knee\"        PT ASSESSMENT (last 12 hours)      Physical Therapy Evaluation     Row Name 19 1315          PT Evaluation Time/Intention    Subjective Information  no complaints  -LM     Document Type  evaluation  -LM     Mode of Treatment  physical therapy  -LM     Patient Effort  fair  -LM     Symptoms Noted During/After Treatment  none  -LM     Comment  Pt agreeable to rolling in the bed.  Educated on importance of sitting EOB - however, pt declined.  Will  " 3x/wk on a trial basis to see if participation increases or if progress can be made.  Wife states pt has been bedridden since June 27, 2018.  -     Row Name 01/13/19 1315          General Information    Patient Profile Reviewed?  yes  -     Onset of Illness/Injury or Date of Surgery  01/12/19  -     Referring Physician  HOLLY Zhang  -     Patient Observations  alert;cooperative;agree to therapy  -LM     Patient/Family Observations  WIfe present.  -LM     General Observations of Patient  Pt lying in bed.  Noted to have an IV.  Pleasant and agreeable to PT eval.  -LM     Prior Level of Function  min assist:;bed mobility;max assist:;ADL's Wife states pt has been bedridden since 6/27/18  -     Equipment Currently Used at Home  hospital bed  -     Pertinent History of Current Functional Problem  Admitted with complaints of fever, nausea, and vomiting.  Dx: Sepsis; Complicated UTI; Retroperitoneal abscess vs hematoma; L Foot Wound; CONCHIS on CKD  -LM     Existing Precautions/Restrictions  fall  -LM     Risks Reviewed  patient:;spouse/S.O.:;LOB;increased discomfort  -LM     Benefits Reviewed  patient:;spouse/S.O.:;improve function;increase independence;increase strength;increase balance  -LM     Barriers to Rehab  previous functional deficit  -LM     Row Name 01/13/19 1315          Relationship/Environment    Lives With  spouse  -     Row Name 01/13/19 1315          Resource/Environmental Concerns    Current Living Arrangements  home/apartment/condo  -     Row Name 01/13/19 1315          Cognitive Assessment/Intervention- PT/OT    Orientation Status (Cognition)  oriented x 3  -LM     Follows Commands (Cognition)  follows one step commands;over 90% accuracy;physical/tactile prompts required;verbal cues/prompting required  -     Row Name 01/13/19 1315          Bed Mobility Assessment/Treatment    Bed Mobility Assessment/Treatment  rolling left;rolling right;scooting/bridging  -     Rolling Left  [place] (Bed Mobility)  contact guard  -LM     Rolling Right [place] (Bed Mobility)  contact guard  -LM     Scooting/Bridging [place] (Bed Mobility)  dependent (less than 25% patient effort);2 person assist  -LM     Bed Mobility, Safety Issues  decreased use of legs for bridging/pushing  -LM     Assistive Device (Bed Mobility)  bed rails;draw sheet;head of bed elevated  -LM     Comment (Bed Mobility)  Despite encouragement, pt declined sitting EOB.  -LM     Row Name 01/13/19 1315          General ROM    GENERAL ROM COMMENTS  B hip flex AROM WFL; Decreased active/passive knee flex (with hard end feel); Decreased terminal knee extension; Decreased ankle DF AROM  -LM     Row Name 01/13/19 1315          MMT (Manual Muscle Testing)    General MMT Comments  B Hip Flex - 3+/5; Knee flex/ext - 2/5; Ankle DF- 2+/5  -LM     Row Name 01/13/19 1315          Sensory Assessment/Intervention    Sensory General Assessment  no sensation deficits identified BLEs  -LM     Row Name 01/13/19 1315          Pain Assessment    Additional Documentation  Pain Scale: FACES Pre/Post-Treatment (Group)  -LM     Row Name 01/13/19 1315          Pain Scale: FACES Pre/Post-Treatment    Pain: FACES Scale, Pretreatment  0-->no hurt  -LM     Pain: FACES Scale, Post-Treatment  0-->no hurt  -LM     Row Name             [REMOVED] Wound 01/12/19 2057 Left other (see notes)    Wound - Properties Group Date first assessed: 01/12/19  -CB Time first assessed: 2057  -CB Side: Left  -CB Location: other (see notes)  -CB, heel  Resolution Date: 01/13/19  -AB, double charted  Resolution Time: 1009  -AB    Row Name             [REMOVED] Wound 01/12/19 2058 other (see notes) pressure injury    Wound - Properties Group Date first assessed: 01/12/19  -CB Time first assessed: 2058  -CB Present On Admission : yes  -AB Location: other (see notes)  -CB, coccyx/sacral area.   Type: pressure injury  -AB Resolution Date: 01/13/19  -AB, double charted   Resolution Time: 1008  -AB    Row Name             Wound 01/13/19 0200 medial coccyx pressure injury    Wound - Properties Group Date first assessed: 01/13/19  -KB Time first assessed: 0200  -KB Orientation: medial  -KB Location: coccyx  -KB Type: pressure injury  -KB Stage, Pressure Injury: Stage 2  -KB    Row Name             Wound 01/13/19 0200 Left heel    Wound - Properties Group Date first assessed: 01/13/19  -KB Time first assessed: 0200  -KB Side: Left  -KB Location: heel  -KB Stage, Pressure Injury: unstageable  -KB    Row Name 01/13/19 1315          Plan of Care Review    Plan of Care Reviewed With  patient;spouse  -LM     Row Name 01/13/19 1315          Physical Therapy Clinical Impression    Date of Referral to PT  01/13/19  -LM     PT Diagnosis (PT Clinical Impression)  Impaired functional mobility, balance, gait, and endurance  -LM     Criteria for Skilled Interventions Met (PT Clinical Impression)  yes;treatment indicated  -LM     Rehab Potential (PT Clinical Summary)  fair, will monitor progress closely  -LM     Care Plan Review (PT)  evaluation/treatment results reviewed;care plan/treatment goals reviewed;risks/benefits reviewed;current/potential barriers reviewed;patient/other agree to care plan  -LM     Row Name 01/13/19 1315          Vital Signs    Pre Systolic BP Rehab  -- VSS - RN cleared for tx  -LM     Pre Patient Position  Supine  -LM     Intra Patient Position  Side Lying  -LM     Post Patient Position  Supine  -LM     Row Name 01/13/19 1315          Physical Therapy Goals    Bed Mobility Goal Selection (PT)  bed mobility, PT goal 1  -LM     Transfer Goal Selection (PT)  transfer, PT goal 1  -LM     Balance Goal Selection (PT)  balance, PT goal 1  -LM     Additional Documentation  Balance Goal Selection (PT) (Row)  -LM     Row Name 01/13/19 1312          Bed Mobility Goal 1 (PT)    Activity/Assistive Device (Bed Mobility Goal 1, PT)  sit to supine/supine to sit  -LM     [place]  Level/Cues Needed (Bed Mobility Goal 1, PT)  minimum assist (75% or more patient effort)  -LM     Time Frame (Bed Mobility Goal 1, PT)  long term goal (LTG);2 weeks  -LM     Row Name 01/13/19 1315          Transfer Goal 1 (PT)    Activity/Assistive Device (Transfer Goal 1, PT)  sit-to-stand/stand-to-sit  -LM     [place] Level/Cues Needed (Transfer Goal 1, PT)  moderate assist (50-74% patient effort)  -LM     Time Frame (Transfer Goal 1, PT)  long term goal (LTG);2 weeks  -LM     Row Name 01/13/19 1315          Balance Goal 1 (PT)    Activity/Assistive Device (Balance Goal 1, PT)  sitting, static  -LM     [place] Level/Cues Needed (Balance Goal 1, PT)  supervision required  -LM     Time Frame (Balance Goal 1, PT)  long term goal (LTG);2 weeks  -LM     Row Name 01/13/19 1315          Positioning and Restraints    Pre-Treatment Position  in bed  -LM     Post Treatment Position  bed  -LM     In Bed  supine;call light within reach;encouraged to call for assist;exit alarm on;with family/caregiver;notified nsg  -LM       User Key  (r) = Recorded By, (t) = Taken By, (c) = Cosigned By    Initials Name Provider Type    LM Kylie Preciado PT Physical Therapist    Tawanna Franco, RN Registered Nurse    Halley Calderón RN Registered Nurse    Meir Kong RN Registered Nurse        Physical Therapy Education     Title: PT OT SLP Therapies (In Progress)     Topic: Physical Therapy (In Progress)     Point: Mobility training (In Progress)     Learning Progress Summary           Patient Acceptance, E, NR by  at 1/13/2019  1:59 PM                   Point: Precautions (In Progress)     Learning Progress Summary           Patient Acceptance, E, NR by  at 1/13/2019  1:59 PM                               User Key     Initials Effective Dates Name Provider Type [facility] 06/15/16 -  Kylie Preciado PT Physical Therapist PT              PT Recommendation and Plan  Anticipated Discharge Disposition  (PT): home with assist, home with home health  Planned Therapy Interventions (PT Eval): balance training, bed mobility training, home exercise program, neuromuscular re-education, patient/family education, postural re-education, ROM (range of motion), strengthening, stretching, transfer training  Therapy Frequency (PT Clinical Impression): 3 times/wk  Outcome Summary/Treatment Plan (PT)  Anticipated Discharge Disposition (PT): home with assist, home with home health  Plan of Care Reviewed With: patient, spouse  Outcome Summary: PT evaluation completed on this date.  Pt rolled bilaterally in the bed with CGA, but declined further bed mobility.  Pt would benefit from skilled PT to improve mobility to ease the care his wife has to give.  PT will  3x/wk on a trial basis to see if participation increases or if progress can be made. Recommend home with assist and  PT at d/c.  Outcome Measures     Row Name 01/13/19 1315 01/13/19 1010          How much help from another person do you currently need...    Turning from your back to your side while in flat bed without using bedrails?  3  -LM  --     Moving from lying on back to sitting on the side of a flat bed without bedrails?  2  -LM  --     Moving to and from a bed to a chair (including a wheelchair)?  1  -LM  --     Standing up from a chair using your arms (e.g., wheelchair, bedside chair)?  1  -LM  --     Climbing 3-5 steps with a railing?  1  -LM  --     To walk in hospital room?  1  -LM  --     AM-PAC 6 Clicks Score  9  -LM  --        How much help from another is currently needed...    Putting on and taking off regular lower body clothing?  --  1  -TA     Bathing (including washing, rinsing, and drying)  --  2  -TA     Toileting (which includes using toilet bed pan or urinal)  --  2  -TA     Putting on and taking off regular upper body clothing  --  2  -TA     Taking care of personal grooming (such as brushing teeth)  --  2  -TA     Eating meals  --  3  -TA      Score  --  12  -TA        Functional Assessment    Outcome Measure Options  AM-PAC 6 Clicks Basic Mobility (PT)  -LM  AM-PAC 6 Clicks Daily Activity (OT)  -TA       User Key  (r) = Recorded By, (t) = Taken By, (c) = Cosigned By    Initials Name Provider Type    Kylie Mathis, PT Physical Therapist    TA Ronnie Kent, OT Occupational Therapist         Time Calculation:   PT Charges     Row Name 01/13/19 1315             Time Calculation    Start Time  1315  -LM      PT Received On  01/13/19  -LM      PT Goal Re-Cert Due Date  01/23/19  -        User Key  (r) = Recorded By, (t) = Taken By, (c) = Cosigned By    Initials Name Provider Type    Kylie Mathis, PT Physical Therapist        Therapy Suggested Charges     Code   Minutes Charges    None           Therapy Charges for Today     Code Description Service Date Service Provider Modifiers Qty    [id number] HC PT EVAL MOD COMPLEXITY 4 1/13/2019 Kylie Preciado, PT GP 1          PT G-Codes  Outcome Measure Options: AM-PAC 6 Clicks Basic Mobility (PT)  AM-PAC 6 Clicks Score: 9  Score: 12      Kylie Preciado, PT  1/13/2019

## 2019-01-13 NOTE — THERAPY EVALUATION
"Acute Care - Occupational Therapy Initial Evaluation  [facility]     Patient Name: Leo Elias  : 1935  MRN: [id number]  Today's Date: 2019  Onset of Illness/Injury or Date of Surgery: (P) 19  Date of Referral to OT: 19  Referring Physician: RACHEL) HOLLY Zhang    Admit Date: 2019       ICD-10-CM ICD-9-CM   1. Sepsis due to urinary tract infection (CMS/HCC) A41.9 038.9    N39.0 995.91     599.0   2. Retroperitoneal fluid collection R18.8 789.59   3. Pleural effusion, right J90 511.9   4. Pressure injury of left heel, stage 3 (CMS/HCC) L89.623 707.07     707.23   5. Constipation, unspecified constipation type K59.00 564.00   6. Acute febrile illness R50.9 780.60   7. Impaired mobility and ADLs Z74.09 799.89   8. Impaired functional mobility, balance, gait, and endurance Z74.09 V49.89     Patient Active Problem List   Diagnosis   • Sepsis, unspecified organism (CMS/HCC)   • Hypocalcemia   • Acute renal failure superimposed on stage 3 chronic kidney disease (CMS/HCC)   • Sepsis due to urinary tract infection (CMS/HCC)   • Type 2 diabetes mellitus (CMS/HCC)   • Hypothyroidism (acquired)   • Essential hypertension   • Wound healing, delayed, left foot   • Acute UTI (urinary tract infection)     Past Medical History:   Diagnosis Date   • Diabetes mellitus (CMS/HCC)    • Disease of thyroid gland    • Hypertension    • Renal disorder      Past Surgical History:   Procedure Laterality Date   • KNEE SURGERY      \"septic knee\"          OT ASSESSMENT FLOWSHEET (last 72 hours)      Occupational Therapy Evaluation     Row Name 19 1010                   OT Evaluation Time/Intention    Subjective Information  no complaints  -TA        Document Type  evaluation Evaluation at 13:10-13:33  -TA        Mode of Treatment  occupational therapy  -TA        Patient Effort  adequate  -TA        Symptoms Noted During/After Treatment  none  -TA           General Information    Patient Profile " Reviewed?  yes  -TA        Onset of Illness/Injury or Date of Surgery  01/12/19  -TA        Referring Physician  HOLLY Kraft  -TA        Patient Observations  alert;cooperative;agree to therapy  -TA        Patient/Family Observations  wife present in room  -TA        General Observations of Patient  Pt supine in bed, RA, IV intact RUE  -TA        Prior Level of Function  min assist:;bed mobility;max assist:;ADL's  -TA        Equipment Currently Used at Home  hospital bed  -TA        Pertinent History of Current Functional Problem  Pt to ED with N/V, abdominal pain; UTI; retroperitoneal abscess vs. hematoma. Per pt/spouse report, pt has been bedbound x 4 months.  -TA        Existing Precautions/Restrictions  fall  -TA        Risks Reviewed  patient:;LOB;dizziness;increased discomfort;change in vital signs;lines disloged  -TA        Benefits Reviewed  patient:;improve function;increase independence;increase strength;increase balance;decrease pain;increase knowledge  -TA        Barriers to Rehab  previous functional deficit  -TA           Relationship/Environment    Primary Source of Support/Comfort  spouse  -TA        Lives With  spouse  -TA           Resource/Environmental Concerns    Current Living Arrangements  home/apartment/condo  -TA           Cognitive Assessment/Intervention- PT/OT    Orientation Status (Cognition)  oriented x 3  -TA        Follows Commands (Cognition)  WFL;follows one step commands;over 90% accuracy  -TA        Safety Deficit (Cognitive)  mild deficit;safety precautions awareness;safety precautions follow-through/compliance  -TA           Safety Issues, Functional Mobility    Safety Issues Affecting Function (Mobility)  safety precaution awareness;safety precautions follow-through/compliance  -TA        Impairments Affecting Function (Mobility)  endurance/activity tolerance;range of motion (ROM);strength  -TA           Bed Mobility Assessment/Treatment    Bed Mobility  Assessment/Treatment  rolling left;rolling right;scooting/bridging  -TA        Rolling Left Power (Bed Mobility)  contact guard  -TA        Rolling Right Power (Bed Mobility)  contact guard  -TA        Scooting/Bridging Power (Bed Mobility)  dependent (less than 25% patient effort);2 person assist  -TA        Bed Mobility, Safety Issues  decreased use of arms for pushing/pulling;decreased use of legs for bridging/pushing;impaired trunk control for bed mobility  -TA        Assistive Device (Bed Mobility)  bed rails;draw sheet  -TA        Comment (Bed Mobility)  Pt able to use bed rails to roll R/L; DepX2 for scooting to HOB  -TA           Functional Mobility    Functional Mobility- Ind. Level  unable to perform  -TA           ADL Assessment/Intervention    BADL Assessment/Intervention  grooming  -TA           Grooming Assessment/Training    Power Level (Grooming)  set up;conditional independence  -TA        Grooming Position  sitting up in bed  -TA           BADL Safety/Performance    Impairments, BADL Safety/Performance  balance;endurance/activity tolerance;range of motion;strength  -TA        Skilled BADL Treatment/Intervention  BADL process/adaptation training  -TA           General ROM    GENERAL ROM COMMENTS  BUE AROM WFL  -TA           MMT (Manual Muscle Testing)    General MMT Comments  BUE 4/5  -TA           Motor Assessment/Interventions    Additional Documentation  -- Pt declined EOB sitting to assess balance  -TA           Sensory Assessment/Intervention    Sensory General Assessment  no sensation deficits identified;other (see comments) BUE intact  -TA           Positioning and Restraints    Pre-Treatment Position  in bed  -TA        Post Treatment Position  bed  -TA        In Bed  notified nsg;supine;call light within reach;encouraged to call for assist;exit alarm on;side rails up x3;legs elevated;waffle boots/both;L heel elevated  -TA           Pain Assessment    Additional  Documentation  Pain Scale: Numbers Pre/Post-Treatment (Group)  -TA           Pain Scale: Numbers Pre/Post-Treatment    Pain Scale: Numbers, Pretreatment  0/10 - no pain  -TA        Pain Scale: Numbers, Post-Treatment  0/10 - no pain  -TA        Pre/Post Treatment Pain Comment  tolerated  -TA        Pain Intervention(s)  Repositioned;Ambulation/increased activity  -TA           [REMOVED] Wound 01/12/19 2057 Left other (see notes)    Wound - Properties Group Date first assessed: 01/12/19  -CB Time first assessed: 2057  -CB Side: Left  -CB Location: other (see notes)  -CB, heel  Resolution Date: 01/13/19  -AB, double charted  Resolution Time: 1009  -AB       [REMOVED] Wound 01/12/19 2058 other (see notes) pressure injury    Wound - Properties Group Date first assessed: 01/12/19  -CB Time first assessed: 2058  -CB Present On Admission : yes  -AB Location: other (see notes)  -CB, coccyx/sacral area.   Type: pressure injury  -AB Resolution Date: 01/13/19  -AB, double charted  Resolution Time: 1008  -AB       Wound 01/13/19 0200 medial coccyx pressure injury    Wound - Properties Group Date first assessed: 01/13/19  -KB Time first assessed: 0200  -KB Orientation: medial  -KB Location: coccyx  -KB Type: pressure injury  -KB Stage, Pressure Injury: Stage 2  -KB       Wound 01/13/19 0200 Left heel    Wound - Properties Group Date first assessed: 01/13/19  -KB Time first assessed: 0200  -KB Side: Left  -KB Location: heel  -KB Stage, Pressure Injury: unstageable  -KB       Coping    Observed Emotional State  calm;cooperative  -TA        Verbalized Emotional State  acceptance  -TA           Plan of Care Review    Plan of Care Reviewed With  patient;spouse  -TA           Clinical Impression (OT)    Date of Referral to OT  01/12/19  -TA        OT Diagnosis  Impaired mobility and ADLs  -TA        Patient/Family Goals Statement (OT Eval)  none stated  -TA        Criteria for Skilled Therapeutic Interventions Met (OT Eval)   yes;treatment indicated  -TA        Rehab Potential (OT Eval)  fair, will monitor progress closely  -TA        Therapy Frequency (OT Eval)  3 times/wk  -TA        Care Plan Review (OT)  evaluation/treatment results reviewed;risks/benefits reviewed;patient/other agree to care plan  -TA        Anticipated Discharge Disposition (OT)  home with assist;home with home health  -TA           Vital Signs    Pre Systolic BP Rehab  -- VSS; RN cleared pt for tx  -TA        Pre Patient Position  Supine  -TA        Intra Patient Position  Side Lying  -TA        Post Patient Position  Supine  -TA           Planned OT Interventions    Planned Therapy Interventions (OT Eval)  activity tolerance training;BADL retraining;functional balance retraining;occupation/activity based interventions;ROM/therapeutic exercise;strengthening exercise  -TA           OT Goals    Bed Mobility Goal Selection (OT)  bed mobility, OT goal 1  -TA        Bathing Goal Selection (OT)  bathing, OT goal 1  -TA        Strength Goal Selection (OT)  strength, OT goal 1  -TA        Additional Documentation  Strength Goal Selection (OT) (Row)  -TA           Bed Mobility Goal 1 (OT)    Activity/Assistive Device (Bed Mobility Goal 1, OT)  sit to supine;supine to sit  -TA        Sutton Level/Cues Needed (Bed Mobility Goal 1, OT)  maximum assist (25-49% patient effort)  -TA        Time Frame (Bed Mobility Goal 1, OT)  1 week  -TA           Bathing Goal 1 (OT)    Activity/Assistive Device (Bathing Goal 1, OT)  upper body bathing  -TA        S[place] Level/Cues Needed (Bathing Goal 1, OT)  set-up required;supervision required  -TA        Time Frame (Bathing Goal 1, OT)  1 week  -TA           Strength Goal 1 (OT)    Strength Goal 1 (OT)  Pt will increase MMS by 1/2 MMG to support fxl I  -TA        Time Frame (Strength Goal 1, OT)  1 week  -TA          User Key  (r) = Recorded By, (t) = Taken By, (c) = Cosigned By    Initials Name Effective Dates    TA [place],  Ronnie VEGA, OT 03/14/16 -     Tawanna Franco RN 04/23/18 -     Halley Calderón RN 06/16/16 -     Meir Kong RN 12/08/16 -          Occupational Therapy Education     Title: PT OT SLP Therapies (In Progress)     Topic: Occupational Therapy (In Progress)     Point: Precautions (Done)     Description: Instruct learner(s) on prescribed precautions during self-care and functional transfers.    Learning Progress Summary           Patient Acceptance, E, VU by TA at 1/13/2019  1:52 PM    Comment:  Body mechnaics with bed mobility; reinforced need for call for assist with OOB activities.                   Point: Body mechanics (Done)     Description: Instruct learner(s) on proper positioning and spine alignment during self-care, functional mobility activities and/or exercises.    Learning Progress Summary           Patient Acceptance, E, VU by TA at 1/13/2019  1:52 PM    Comment:  Body mechnaics with bed mobility; reinforced need for call for assist with OOB activities.                               User Key     Initials Effective Dates Name Provider Type Discipline    TA 03/14/16 -  Ronnie Kent, OT Occupational Therapist OT                  OT Recommendation and Plan  Outcome Summary/Treatment Plan (OT)  Anticipated Discharge Disposition (OT): home with assist, home with home health  Planned Therapy Interventions (OT Eval): activity tolerance training, BADL retraining, functional balance retraining, occupation/activity based interventions, ROM/therapeutic exercise, strengthening exercise  Therapy Frequency (OT Eval): 3 times/wk  Plan of Care Review  Plan of Care Reviewed With: patient, spouse  Plan of Care Reviewed With: patient, spouse  Outcome Summary: VSS; Pt presents with fxl decline from PLOF, deficits in ADL performance, fxl mobility, occupational endurance. Will benefit from skilled OT services to address deficits, facilitate increased fxl I. Recommend home with assist and HHOT.      Outcome Measures     Row Name 01/13/19 1010             How much help from another is currently needed...    Putting on and taking off regular lower body clothing?  1  -TA      Bathing (including washing, rinsing, and drying)  2  -TA      Toileting (which includes using toilet bed pan or urinal)  2  -TA      Putting on and taking off regular upper body clothing  2  -TA      Taking care of personal grooming (such as brushing teeth)  2  -TA      Eating meals  3  -TA      Score  12  -TA         Functional Assessment    Outcome Measure Options  AM-PAC 6 Clicks Daily Activity (OT)  -TA        User Key  (r) = Recorded By, (t) = Taken By, (c) = Cosigned By    Initials Name Provider Type    TA Ronnie Kent OT Occupational Therapist          Time Calculation:   Time Calculation- OT     Row Name 01/13/19 1357 01/13/19 1355          Time Calculation- OT    OT Start Time  -- ttc 8 minutes  -TA  1010  -TA     Total Timed Code Minutes- OT  8 minute(s)  -TA  --     OT Received On  01/13/19  -TA  --     OT Goal Re-Cert Due Date  01/23/19  -TA  --       User Key  (r) = Recorded By, (t) = Taken By, (c) = Cosigned By    Initials Name Provider Type    TA Ronnie Kent OT Occupational Therapist        Therapy Suggested Charges     Code   Minutes Charges    None           Therapy Charges for Today     Code Description Service Date Service Provider Modifiers Qty    [id number]  OT EVAL MOD COMPLEXITY 4 1/13/2019 Ronnie Kent OT GO 1    [id number]  OT THERAPEUTIC ACT EA 15 MIN 1/13/2019 Ronnie Kent OT GO 1               Ronnie Kent OT  1/13/2019

## 2019-01-13 NOTE — PLAN OF CARE
Problem: Patient Care Overview  Goal: Plan of Care Review  Outcome: Ongoing (interventions implemented as appropriate)   01/13/19 2456   Coping/Psychosocial   Plan of Care Reviewed With patient;spouse   OTHER   Outcome Summary VSS; Pt presents with fxl decline from PLOF, deficits in ADL performance, fxl mobility, occupational endurance. Will benefit from skilled OT services to address deficits, facilitate increased fxl I. Recommend home with assist and HHOT.

## 2019-01-13 NOTE — CONSULTS
Leo BYERS Magna  [id number]  1935       Patient Care Team:  Provider, No Known as PCP - General  Provider, No Known as PCP - Family Medicine        General Surgery Consult  Date 1/13/19  Consultant Dr Hernandez    Chief complaint right retroperitoneal fluid collection    Subjective     Patient is a 83 y.o. male presents with acute onset of nausea and vomiting. Onset of symptoms was abrupt starting 1 day ago.  Symptoms are associated with subjective fever.  Patient has multiple medical problems and is bedridden and DNR status.  In the emergency room he was noted to have a temperature of 103.  Workup with CT scan showed right pleural effusion and concern about right retroperitoneal fluid collection concerning for possible hematoma versus abscess.  White blood count is elevated with too numerous to count white blood cells in the urine.  Patient also has a chronic left heel ulcer with mild cellulitis.  since admission he had multiple bowel movements.  He's awake and denies abdominal pain this morning.  He denies any recent falls or trauma to the right flank.  No further nausea or vomiting.  No history of abdominal surgeries.    Allergy: No Known Allergies    Home Medications:   No current facility-administered medications on file prior to encounter.      Current Outpatient Medications on File Prior to Encounter   Medication Sig   • FERROUS SULFATE PO Take  by mouth Daily.   • hydrOXYzine (ATARAX) 25 MG tablet Take 25 mg by mouth 2 (Two) Times a Day.   • levothyroxine (SYNTHROID, LEVOTHROID) 50 MCG tablet Take 50 mcg by mouth Daily.   • Melatonin 10 MG tablet Take  by mouth Every Night.   • metroNIDAZOLE (FLAGYL) 500 MG tablet Take 500 mg by mouth 3 (Three) Times a Day.   • NIFEdipine (PROCARDIA) 20 MG capsule Take 20 mg by mouth Daily.   • NON FORMULARY PATIENT SPOUSE STATES THAT THE PATIENT IS ON INSULIN. SHE STATES THAT HE USED TO BE ON 70/30 BUT IS NOW ON SOMETHING ELSE BUT SHE IS UNABLE TO RECALL THE NAME OF IT.   •  "Probiotic Product (PROBIOTIC PO) Take  by mouth.   • QUETIAPINE FUMARATE PO Take 60 mg by mouth.   • sulfamethoxazole-trimethoprim (BACTRIM DS,SEPTRA DS) 800-160 MG per tablet Take 1 tablet by mouth 2 (Two) Times a Day.   • tamsulosin (FLOMAX) 0.4 MG capsule 24 hr capsule Take 1 capsule by mouth Every Night.       PMHx:   Patient Active Problem List   Diagnosis   • Sepsis, unspecified organism (CMS/[facility])   • Hypocalcemia   • Acute renal failure superimposed on stage 3 chronic kidney disease (CMS/[facility])   • Sepsis due to urinary tract infection (CMS/[facility])   • Type 2 diabetes mellitus (CMS/[facility])   • Hypothyroidism (acquired)   • Essential hypertension   • Wound healing, delayed, left foot   • Acute UTI (urinary tract infection)       Past Surgical History:   Knee surgery    Family History:History reviewed. No pertinent family history.    Social History:  Social History     Socioeconomic History   • Marital status:      Spouse name: Not on file   • Number of children: Not on file   • Years of education: Not on file   • Highest education level: Not on file   Social Needs   • Financial resource strain: Not on file   • Food insecurity - worry: Not on file   • Food insecurity - inability: Not on file   • Transportation needs - medical: Not on file   • Transportation needs - non-medical: Not on file   Occupational History   • Not on file   Tobacco Use   • Smoking status: Never Smoker   • Smokeless tobacco: Never Used   Substance and Sexual Activity   • Alcohol use: No     Frequency: Never   • Drug use: Not on file   • Sexual activity: Not on file   Other Topics Concern   • Not on file   Social History Narrative   • Not on file       Review of Systems   Pertinent items are noted in HPI      Physical Exam:    Objective     Vital Signs  Blood pressure 112/74, pulse 86, temperature 98.7 °F (37.1 °C), temperature source Oral, resp. rate 19, height 182.9 cm (72\"), weight 111 kg (244 lb 3.2 oz), SpO2 95 %.    Intake/Output " Summary (Last 24 hours) at 1/13/2019 0922  Last data filed at 1/13/2019 0500  Gross per 24 hour   Intake 982.71 ml   Output --   Net 982.71 ml       Physical Exam:      General Appearance:    Alert, cooperative, in no acute distress,    Head:    Normocephalic, without obvious abnormality, atraumatic   Eyes:            Lids and lashes normal, conjunctivae and sclerae normal, no   icterus, no pallor, corneas clear, PERRLA   Ears:    Ears appear intact with no abnormalities noted   Throat:   No oral lesions, no thrush, oral mucosa moist   Neck:   No adenopathy, supple, trachea midline, no thyromegaly, no   carotid bruit, no JVD       Lungs:     respirations regular, even and unlabored    Heart:    Regular rhythm and normal rate, normal S1 and S2, no            murmur, no gallop, no rub, no click   Chest Wall:    No abnormalities observed   Abdomen:     Normal bowel sounds, no masses, no organomegaly, soft        non-tender, non-distended, no guarding, no rebound                tenderness.  Mild bruising around umbilicus.  No right flank tenderness or bruising noted.  No masses    Rectal:     Deferred   Extremities:   Moves all extremities well, no edema, no cyanosis, mild left ankle cellulitis with a heel ulcer            Lymph nodes:   No palpable adenopathy           Results Review:    I reviewed the patient's new clinical results.  Results from last 7 days   Lab Units  01/13/19   0526   WBC 10*3/mm3  12.30*   HEMOGLOBIN g/dL  10.0*   HEMATOCRIT %  30.7*   PLATELETS 10*3/mm3  221     Results from last 7 days   Lab Units  01/13/19   0526   SODIUM mmol/L  133   POTASSIUM mmol/L  4.0   CHLORIDE mmol/L  106   CO2 mmol/L  18.0*   BUN mg/dL  80*   CREATININE mg/dL  2.31*   GLUCOSE mg/dL  95   CALCIUM mg/dL  8.2*       ASSESSMENT AND PLAN:   Acute onset of nausea and vomiting that resolved  Leukocytosis and fever with workup remarkable for urinary tract infection and concern about a possible fluid collection in the right  retroperitoneal region concerning for possible hematoma versus abscess  Plan to review the studies with radiology  Patient may need further workup with ultrasound and possibly CT-guided drainage  In the meantime continue with IV antibiotics          Sepsis, unspecified organism (CMS/HCC)    Hypocalcemia    Acute renal failure superimposed on stage 3 chronic kidney disease (CMS/HCC)    Sepsis due to urinary tract infection (CMS/HCC)    Type 2 diabetes mellitus (CMS/HCC)    Hypothyroidism (acquired)    Essential hypertension    Wound healing, delayed, left foot    Acute UTI (urinary tract infection)        I discussed the patients findings and my recommendations with patient.   Thank you for the consultation.    Guy Frausto MD  01/13/19  9:22 AM

## 2019-01-13 NOTE — PLAN OF CARE
Problem: Skin Injury Risk (Adult)  Goal: Identify Related Risk Factors and Signs and Symptoms  Outcome: Ongoing (interventions implemented as appropriate)   01/13/19 5948   Skin Injury Risk (Adult)   Related Risk Factors (Skin Injury Risk) advanced age

## 2019-01-13 NOTE — PLAN OF CARE
Problem: Patient Care Overview  Goal: Plan of Care Review  Outcome: Ongoing (interventions implemented as appropriate)   01/13/19 7116   Coping/Psychosocial   Plan of Care Reviewed With patient;spouse   OTHER   Outcome Summary PT evaluation completed on this date. Pt rolled bilaterally in the bed with CGA, but declined further bed mobility. Pt would benefit from skilled PT to improve mobility to ease the care his wife has to give. PT will  3x/wk on a trial basis to see if participation increases or if progress can be made. Recommend home with assist and HH PT at d/c.

## 2019-01-13 NOTE — PROGRESS NOTES
Pharmacokinetic Consult - Vancomycin Dosing    Leo Elias is a 83 y.o. male who has been consulted for vancomycin dosing for sepsis.    Relevant clinical data and objective history reviewed:  Creatinine   Date Value Ref Range Status   01/12/2019 2.38 (H) 0.60 - 1.30 mg/dL Final   08/27/2018 2.23 (H) 0.60 - 1.30 mg/dL Final   07/08/2018 3.64 (H) 0.60 - 1.30 mg/dL Final     BUN   Date Value Ref Range Status   01/12/2019 86 (H) 9 - 23 mg/dL Final   08/27/2018 49 (H) 9 - 23 mg/dL Final   07/08/2018 66 (H) 9 - 23 mg/dL Final     Estimated Creatinine Clearance: 31.1 mL/min (A) (by C-G formula based on SCr of 2.38 mg/dL (H)).  Lab Results   Component Value Date/Time    WBC 18.20 (H) 01/12/2019 06:27 PM    HGB 10.6 (L) 01/12/2019 06:27 PM    HCT 32.3 (L) 01/12/2019 06:27 PM    MCV 85.2 01/12/2019 06:27 PM     01/12/2019 06:27 PM     Temp Readings from Last 3 Encounters:   01/12/19 99.8 °F (37.7 °C) (Oral)       Assessment/Plan  The patient got a dose of 2250mg in the ED . Given age and renal function, will get a random level with morning labs.  Due to infection severity, will target a trough of 15-20.     Pharmacy will continue to follow the patient’s culture results and clinical progress daily.    Sagar Yoder, ChrisD

## 2019-01-13 NOTE — H&P
"[facility] Medicine Services  HISTORY AND PHYSICAL    Patient Name: Leo Elias  : 1935  MRN: [id number]  Primary Care Physician: Provider, No Known    Subjective   Subjective     Chief Complaint:  Fever,nausea/vomiting     HPI:  Leo Elias is a 83 y.o. male with a past medical history significant for diabetes mellitus type 2, essential  Hypertension, hypothyroidism and CKD stage III who presents to the ED with complaints of fever, nausea and vomiting.  Wife at bedside states that about 7 months ago patient had underwent knee surgery and since then has significantly declined.  Currently he is bed bound.  Around 3 am patient had acute onset of nausea with vomiting.  Initial emesis appeared dark in color but per wife \"cleared up.\"  He denies any abdominal pain but wife states that he has been \"moaning\" holding his abdomen.  She does report intermittent fevers but denies any back pain, recent trauma, injury, ill contacts, chest pain, shortness of air, cough, diarrhea or melena.  Patient has had a chronic non-healing left found wound that he has had home health following.  Wife states that he previously was on flagyl and just recently was started on bactrim for a presumed UTI.  He reports issues with constipation with no bowel movement in two  Weeks but wife gave suppository tonight and he has had a large bowel movement since being in the ED.  On arrival to the ED tonight patient is febrile with a temp of 103, CT of abdomen and pelvis tonight shows a a right retroperitoneal abnormality possible fluid collection such as a hematoma or abscess. Patient will be admitted to [facility] under the care of the Hospitalist for further evaluation and treatment.      Review of Systems   Constitutional: Positive for appetite change. Negative for activity change, chills, diaphoresis, fatigue, fever and unexpected weight change.   HENT: Negative.    Eyes: Negative for photophobia and visual disturbance. " "  Respiratory: Negative for cough and shortness of breath.    Cardiovascular: Negative for chest pain, palpitations and leg swelling.   Gastrointestinal: Positive for abdominal pain, constipation, nausea and vomiting. Negative for abdominal distention, blood in stool and diarrhea.   Genitourinary: Positive for dysuria. Negative for flank pain, frequency and hematuria.   Musculoskeletal: Negative.    Skin: Negative.    Neurological: Negative.    Psychiatric/Behavioral: Negative.         Otherwise 10-system ROS reviewed and is negative except as mentioned in the HPI.    Personal History     Past Medical History:   Diagnosis Date   • Diabetes mellitus (CMS/HCC)    • Disease of thyroid gland    • Hypertension    • Renal disorder        Past Surgical History:   Procedure Laterality Date   • KNEE SURGERY      \"septic knee\"       Family History: family history is not on file.     Social History:  reports that  has never smoked. he has never used smokeless tobacco. He reports that he does not drink alcohol.    Medications:    (Not in a hospital admission)    No Known Allergies    Objective   Objective     Vital Signs:   Temp:  [103 °F (39.4 °C)] 103 °F (39.4 °C)  Heart Rate:  [] 76  Resp:  [20] 20  BP: (103-128)/(54-89) 103/54        Physical Exam   Constitutional: He is oriented to person, place, and time. He appears well-developed and well-nourished. No distress.   Alert and oriented x3, does ask repetitive questions after already answered.  Wife states having trouble recently with memory.    HENT:   Head: Normocephalic and atraumatic.   Eyes: Conjunctivae and EOM are normal. Pupils are equal, round, and reactive to light. Right eye exhibits no discharge. Left eye exhibits no discharge. No scleral icterus.   Neck: Normal range of motion. Neck supple. No JVD present. No tracheal deviation present. No thyromegaly present.   Cardiovascular: Normal rate, regular rhythm, normal heart sounds and intact distal pulses. Exam " "reveals no gallop and no friction rub.   No murmur heard.  Pulmonary/Chest: Effort normal and breath sounds normal. No stridor. No respiratory distress. He has no wheezes. He has no rales. He exhibits no tenderness.   Abdominal: Soft. He exhibits no distension and no mass. There is no tenderness. There is no rebound and no guarding. No hernia.   Hypoactive bowel sounds    Musculoskeletal: Normal range of motion. He exhibits no edema, tenderness or deformity.   Lymphadenopathy:     He has no cervical adenopathy.   Neurological: He is alert and oriented to person, place, and time.   Skin: Skin is warm and dry. He is not diaphoretic.   Two quarter size wounds to left heel. Posterior wound: yellowish drainage.  Appears to have tunneling to anterior wound.  Wife states she \"packs\" every day.     Psychiatric: He has a normal mood and affect. His behavior is normal.   Nursing note and vitals reviewed.       Results Reviewed:  I have personally reviewed current lab, radiology, and data and agree.    Results from last 7 days   Lab Units  01/12/19   1827   WBC 10*3/mm3  18.20*   HEMOGLOBIN g/dL  10.6*   HEMATOCRIT %  32.3*   PLATELETS 10*3/mm3  270     Results from last 7 days   Lab Units  01/12/19   1827   SODIUM mmol/L  132   POTASSIUM mmol/L  3.8   CHLORIDE mmol/L  104   CO2 mmol/L  18.0*   BUN mg/dL  86*   CREATININE mg/dL  2.38*   GLUCOSE mg/dL  170*   CALCIUM mg/dL  8.4*   ALT (SGPT) U/L  10   AST (SGOT) U/L  13     No results found for: BNP  No results found for: PHART, PCO2  Imaging Results (last 24 hours)     Procedure Component Value Units Date/Time    CT Abdomen Pelvis Without Contrast [[id number]] Collected:  01/12/19 1945     Updated:  01/12/19 2153    Addenda:        Note: Critical Findings were discussed with Jacky Roy at 1/12/2019   9:49   PM EST. The report was understood and acknowledged by the healthcare   giver, who   stated patient has elevated WBCs, no history of recent fall or   anticoagulants.   "  THIS DOCUMENT HAS BEEN ELECTRONICALLY SIGNED BY HOLDEN BACON MD  Signed:  01/12/19 2153 by Holden Bacon MD    Narrative:       EXAM:    CT Abdomen and Pelvis Without Contrast     EXAM DATE/TIME:    1/12/2019 7:45 PM     CLINICAL HISTORY:    83 years old, male; Pain; Abdominal pain; Generalized; Prior surgery;   Additional info: Abdominal distention with vomiting and fever     TECHNIQUE:    Axial computed tomography images of the abdomen and pelvis without contrast.    All CT scans at this facility use at least one of these dose optimization   techniques: automated exposure control; mA and/or kV adjustment per patient   size (includes targeted exams where dose is matched to clinical indication); or   iterative reconstruction.    Coronal reformatted images were created and reviewed.     COMPARISON:    No relevant prior studies available.     FINDINGS:    Lower thorax:  Old granulomatous disease of lung(s). There are coronary artery   atheromatous calcifications, consistent with CAD. Dystrophic calcifications on   the mitral anulus. Normal heart size. Moderate right pleural effusion. Possible   tiny noncalcified 2 mm nodules versus vessels at lateral right lower lobe   base.( For patients at low risk (minimal or absent history of smoking and of   other known risk factors), no routine follow-up is indicated. For patients at   high risk (history of smoking or of other known risk factors), consider   optional CT at 12 months. (Magy, et al., Fleischner Society, 2017).).    Scattered bibasilar subsegmental atelectasis. Mild RLL compressive atelectasis   and/or infiltrate. Some punctate high density possible debris or additional   calcifications in base of right lower lobe. Small hiatal hernia.     ABDOMEN:    Liver:  Evidence of old granulomatous disease of the liver. No hepatomegaly.    Gallbladder and bile ducts: Unremarkable. No calcified stones. No ductal   dilation.    Pancreas:  Pancreatic atrophy.     Spleen: Vascular calcifications. No splenomegaly.    Adrenals:  Borderline in fullness of bilateral adrenals.    Kidneys and ureters:  Mild bilateral perinephric cobwebs/edema. Mild left   renal cortical scarring. No hydronephrosis or nephrolithiasis. No obstructive   uropathy.    Stomach and bowel:  Moderate fecal ball in distended rectum with mild wall   thickening bordered by mild edema, suspect mild stercoral colitis from mild   constipation. There is at least a moderate amount of colonic feces. Gas   scattered in nondilated small bowel. Nonspecific bowel gas. Moderate distention   of stomach containing air and fluid.    Appendix:  Appendix obscured.    Retroperitoneal space:  Oval 3.9 cm x 6.7 cm x 9.1 cm soft tissue density in   right retroperitoneum posterior to the right kidney-parapsoas region bordered   by hazy  density/edema could represent fluid collection such as hematoma or   abscess. No bubbles of air within this abnormal area. Possible slight swelling   of right psoas muscle. Ultrasound may be helpful to confirm suspected internal   fluid. Clinically correlate. Followup recommended.     PELVIS:    Bladder:  Subtotally contracted bladder restricts wall evaluation. No calculi.    Reproductive:  Enlarged prostate with transverse diameter of 5.7 cm.    Subperitoneal space:  Mild perirectal edema.     ABDOMEN and PELVIS:    Intraperitoneal space:  No significant ascites.    Bones/joints:  Mild scoliosis. Degenerative changes of the spine. DJD of   lateral hips. Mild DJD of bilateral sacroiliac joints. Chronic fracture L2   vertebral body.    Soft tissues:  Mild anasarca. Small 1.1 cm patchy or nodular density in   subcutaneous fat flank RLQ. Scattered areas of mild skin thickening along the   anterior pelvis. Mild laxity of right flank abdominal wall. Mild anasarca.   Slightly more prominent edema or contusion in right flank subcutaneous fat.    Vasculature:  Atherosclerotic placques scattered along  some vessels. No aortic   aneurysm. Lower abdominal aortic stent present.    Lymph nodes:  Calcified granulomata within mediastinal lymph node(s) from old   granulomatous disease.  No enlarged pelvic or abdominal lymph nodes.      Impression:       1. CAD.   2. Moderate right pleural effusion.   3. Bibasilar subsegmental atelectasis. Mild RLL compressive atelectasis and/or   infiltrate.   4. Constipation with mild rectal stercoral colitis.   5. Prostate enlargement.   6. Small hiatal hernia.   7. Pancreatic atrophy.   8. Nonspecific bowel gas pattern.   9. Right retroperitoneal 3.9 cm x 6.7 cm x 9.1 cm oval abnormality, possible   fluid collection such as hematoma or abscess. See above.   10. Mild anasarca. Slightly more prominent edema or contusion in right flank   subcutaneous fat.   11.  Suspect mild swelling of the right psoas muscle.  12.  Additional findings, as above.     THIS DOCUMENT HAS BEEN ELECTRONICALLY SIGNED BY HOLDEN BACON MD             Assessment/Plan   Assessment / Plan     Active Hospital Problems    Diagnosis   • SIRS (systemic inflammatory response syndrome) (CMS/HCC)   • Hypocalcemia   • Acute renal failure superimposed on stage 3 chronic kidney disease (CMS/HCC)   • Sepsis due to urinary tract infection (CMS/HCC)   • Type 2 diabetes mellitus (CMS/HCC)   • Hypothyroidism (acquired)   • Essential hypertension   • Wound healing, delayed, left foot   • Acute UTI (urinary tract infection)         Assessment & Plan:  83 year old male presents to the ED accompanied by wife with complaints of nausea/vomiting and fever that began around 3 am this morning.     1) Sepsis per criteria  -tmax: 103, WBC  18.20, lactic acid 2.3, procal 3.14  -source not completely clear but has multiple potential sources: suspect CT of abdomen and pelvis concerning for retroperitoneal abscess vs hematoma, patient has chronic non-healing left foot wound  and underlying UTI.  -will continue empiric vancomycin and  zosyn  -may need further assistance from ID   -blood cultures pending  -urine cultures pending  -continue IVF  -reflex lactate pending    2) Retroperitoneal abscess vs hematoma  -noted on CT of abdomen and pelvis  -H&H stable  -more concerning for abscess with SIRS presentation  -will consult general surgery in am  -will keep NPO until  General surgery sees in am    3) Acute renal failure superimposed on CKD stage III  -baseline creatinine 1.4-1.7 currently 2.23  -likely multifactorial, recently started on bactrim  -will check urine studies  -hold nephrotoxic medications  -repeat labs in am    4) complicated UTI  -UA as noted above  -continue abx as mentioned above  -urine cultures pending  -follow urine cultures     5) Left foot wound  -being followed by home health with wet to dry dressing daily  -will CT left foot to rule out underlying osteomyelitis    -consult WOC    6) Type 2 diabetes mellitus   -check hgb a1c  -start ldssi  -fingersticks achs    7) Hypothyroidism  -continue home levothyroxine  -check TSH    8) Hypocalcemia  -check ionized calcium         DVT prophylaxis:olivia/sscds to right leg only, heparin    CODE STATUS:  DNR/DNI   There are no questions and answers to display.       Admission Status:  I believe this patient meets INPATIENT status due to the need for care which can only be reasonably provided in an hospital setting such as possible need for aggressive/expedited ancillary services and/or consultation services, IV medications, close physician monitoring, and/or procedures.  In such, I feel patient’s risk for adverse outcomes and need for care warrant INPATIENT evaluation and predict the patient’s care encounter to likely last beyond 2 midnights.    HOLLY Hendrickson   01/12/19   11:26 PM    Brief Attending Admission Attestation      I have seen and examined the patient, performing an independent face-to-face diagnostic evaluation with plan of care reviewed and developed with the advanced  practice clinician (APC).     Brief Summary Statement/HPI:   Leo Elias is a 83 y.o. male past medical history of diabetes, hypertension, hypothyroidism.  Patient presented to the ED with acute onset of fever, abdominal pain with associated nausea and vomiting, constipation. He was found to have tmax 103, Cr 2.3, wbc 18.2, procal 3.14, UA suggestive of UTI. CT abd /pelv did show retroperitoneal fluid collection of unknown significance. At the time of my evaluation patient was comfortable, denied any f/c, endorsed some nausea spouse was at bedside.     Attending Physical Exam:  Constitutional: No acute distress, awake, alert  Eyes: PERRLA, sclerae anicteric, no conjunctival injection  HENT: NCAT, mucous membranes moist  Neck: Supple, no thyromegaly, no lymphadenopathy, trachea midline  Respiratory: Clear to auscultation bilaterally, nonlabored respirations   Cardiovascular: RRR, no murmurs, rubs, or gallops, palpable pedal pulses bilaterally  Gastrointestinal: Positive bowel sounds, soft, nontender, nondistended  Musculoskeletal: No bilateral ankle edema, no clubbing or cyanosis to extremities, left heel ulcer, chronic venous stasis changes.  Psychiatric: Appropriate affect, cooperative  Neurologic: Oriented x 3, strength symmetric in all extremities, Cranial Nerves grossly intact to confrontation, speech clear  Skin: No rashes     Brief Assessment/Plan :  - 83 yoM here with sepsis source unknown, suspect underlying UTI however, he also has this retroperitoneal fluid collection and a left non-healing heel ulcer that could all be potential sources. Continue abx, IVF and w/u as above.     See above for further detailed assessment and plan developed with APC which I have reviewed and/or edited.     Electronically signed by Vern Gannon MD, 01/12/19, 11:53 PM.

## 2019-01-13 NOTE — NURSING NOTE
Pt with low ruperto score, impaired mobility and at risk for skin breakdown.  Low air loss mattress and regular bed frame ordered from [employer] [phone].   Per [employer] Stacy black heel boots in place, HHRN packing wound to left heel and pt being repositioned off coccyx.

## 2019-01-14 ENCOUNTER — APPOINTMENT (OUTPATIENT)
Dept: MRI IMAGING | Facility: HOSPITAL | Age: 84
End: 2019-01-14

## 2019-01-14 LAB
ANION GAP SERPL CALCULATED.3IONS-SCNC: 10 MMOL/L (ref 3–11)
BACTERIA SPEC AEROBE CULT: NORMAL
BUN BLD-MCNC: 80 MG/DL (ref 9–23)
BUN/CREAT SERPL: 32.9 (ref 7–25)
CALCIUM SPEC-SCNC: 7.6 MG/DL (ref 8.7–10.4)
CHLORIDE SERPL-SCNC: 107 MMOL/L (ref 99–109)
CO2 SERPL-SCNC: 16 MMOL/L (ref 20–31)
CREAT BLD-MCNC: 2.43 MG/DL (ref 0.6–1.3)
GFR SERPL CREATININE-BSD FRML MDRD: 26 ML/MIN/1.73
GLUCOSE BLD-MCNC: 73 MG/DL (ref 70–100)
GLUCOSE BLDC GLUCOMTR-MCNC: 129 MG/DL (ref 70–130)
GLUCOSE BLDC GLUCOMTR-MCNC: 175 MG/DL (ref 70–130)
GLUCOSE BLDC GLUCOMTR-MCNC: 196 MG/DL (ref 70–130)
GLUCOSE BLDC GLUCOMTR-MCNC: 76 MG/DL (ref 70–130)
POTASSIUM BLD-SCNC: 3.6 MMOL/L (ref 3.5–5.5)
SODIUM BLD-SCNC: 133 MMOL/L (ref 132–146)
VANCOMYCIN SERPL-MCNC: 17.7 MCG/ML (ref 5–40)

## 2019-01-14 PROCEDURE — 25010000002 HEPARIN (PORCINE) PER 1000 UNITS: Performed by: NURSE PRACTITIONER

## 2019-01-14 PROCEDURE — 80202 ASSAY OF VANCOMYCIN: CPT

## 2019-01-14 PROCEDURE — 63710000001 LEVOTHYROXINE 50 MCG TABLET: Performed by: NURSE PRACTITIONER

## 2019-01-14 PROCEDURE — 80048 BASIC METABOLIC PNL TOTAL CA: CPT

## 2019-01-14 PROCEDURE — 36415 COLL VENOUS BLD VENIPUNCTURE: CPT

## 2019-01-14 PROCEDURE — 25010000002 CEFEPIME 2 G/NS 100 ML SOLUTION

## 2019-01-14 PROCEDURE — 99232 SBSQ HOSP IP/OBS MODERATE 35: CPT | Performed by: HOSPITALIST

## 2019-01-14 PROCEDURE — A9270 NON-COVERED ITEM OR SERVICE: HCPCS | Performed by: NURSE PRACTITIONER

## 2019-01-14 PROCEDURE — 82962 GLUCOSE BLOOD TEST: CPT

## 2019-01-14 PROCEDURE — 25010000002 PIPERACILLIN SOD-TAZOBACTAM PER 1 G: Performed by: NURSE PRACTITIONER

## 2019-01-14 PROCEDURE — 25010000002 FLUCONAZOLE PER 200 MG: Performed by: INTERNAL MEDICINE

## 2019-01-14 RX ORDER — CASTOR OIL AND BALSAM, PERU 788; 87 MG/G; MG/G
OINTMENT TOPICAL EVERY 12 HOURS SCHEDULED
Status: DISCONTINUED | OUTPATIENT
Start: 2019-01-14 | End: 2019-01-17 | Stop reason: HOSPADM

## 2019-01-14 RX ORDER — CASTOR OIL AND BALSAM, PERU 788; 87 MG/G; MG/G
OINTMENT TOPICAL AS NEEDED
Status: DISCONTINUED | OUTPATIENT
Start: 2019-01-14 | End: 2019-01-17 | Stop reason: HOSPADM

## 2019-01-14 RX ORDER — FLUCONAZOLE 2 MG/ML
200 INJECTION, SOLUTION INTRAVENOUS DAILY
Status: COMPLETED | OUTPATIENT
Start: 2019-01-14 | End: 2019-01-16

## 2019-01-14 RX ADMIN — CASTOR OIL AND BALSAM, PERU: 788; 87 OINTMENT TOPICAL at 21:16

## 2019-01-14 RX ADMIN — FLUCONAZOLE, SODIUM CHLORIDE 200 MG: 2 INJECTION INTRAVENOUS at 09:26

## 2019-01-14 RX ADMIN — SODIUM CHLORIDE 100 ML/HR: 9 INJECTION, SOLUTION INTRAVENOUS at 05:59

## 2019-01-14 RX ADMIN — Medication 1 CAPSULE: at 09:04

## 2019-01-14 RX ADMIN — HEPARIN SODIUM 5000 UNITS: 5000 INJECTION INTRAVENOUS; SUBCUTANEOUS at 09:04

## 2019-01-14 RX ADMIN — TAZOBACTAM SODIUM AND PIPERACILLIN SODIUM 3.38 G: 375; 3 INJECTION, SOLUTION INTRAVENOUS at 01:03

## 2019-01-14 RX ADMIN — HEPARIN SODIUM 5000 UNITS: 5000 INJECTION INTRAVENOUS; SUBCUTANEOUS at 21:16

## 2019-01-14 RX ADMIN — HYDROXYZINE HYDROCHLORIDE 25 MG: 25 TABLET, FILM COATED ORAL at 09:04

## 2019-01-14 RX ADMIN — TAMSULOSIN HYDROCHLORIDE 0.8 MG: 0.4 CAPSULE ORAL at 21:16

## 2019-01-14 RX ADMIN — SODIUM CHLORIDE 100 ML/HR: 9 INJECTION, SOLUTION INTRAVENOUS at 18:43

## 2019-01-14 RX ADMIN — CEFEPIME 2 G: 2 INJECTION, POWDER, FOR SOLUTION INTRAVENOUS at 09:26

## 2019-01-14 RX ADMIN — LEVOTHYROXINE SODIUM 50 MCG: 50 TABLET ORAL at 06:17

## 2019-01-14 RX ADMIN — SODIUM CHLORIDE, PRESERVATIVE FREE 3 ML: 5 INJECTION INTRAVENOUS at 09:05

## 2019-01-14 RX ADMIN — HYDROXYZINE HYDROCHLORIDE 25 MG: 25 TABLET, FILM COATED ORAL at 21:16

## 2019-01-14 RX ADMIN — INSULIN LISPRO 2 UNITS: 100 INJECTION, SOLUTION INTRAVENOUS; SUBCUTANEOUS at 21:18

## 2019-01-14 NOTE — CONSULTS
"INFECTIOUS DISEASE CONSULT/INITIAL HOSPITAL VISIT    Leo Elias  1935  [id number]    Date of Consult: 1/14/2019    Admission Date: 1/12/2019      Requesting Provider: No ref. provider found  Evaluating Physician: Giovanni Wolf MD    Reason for Consultation: sepsis     History of present illness:    Patient is a 83 y.o. male seen today for sepsis.  He is a previous patient of Dr. Erasmo Cabral with multiple underlying medical problems.  He had been complaining of burning with urination and abdominal pain for approximately one week, and began vomiting Saturday, was febrile,  and was brought to the ED.  His wife states that he was given Flagyl for his left heel decubitus ulcer on Thursday.   He has a history of right knee septic arthritis, and was treated for a \"long time.   He underwent a repeat I & D in June at , due to persistent pain,  and then went to rehab where he developed the ulcer treated through home health.  He has also been diagnosed with malignant melanoma on his right arm at .   While in rehab,  and had fallen on the floor, taken to   where they noted a right retroperitoneal hematoma, and severe anemia, for which he was transfused   When he became ill on Saturday, his wife wanted him to be brought here and \"figure it all out\"   An abdominal Ct revealed a right retroperitoneal 3.9 x 9.1 cm oval abnormality, possible fluid collection such as hematoma or abscess.  CT of left lower extremity skin ulcer with skin irregularity and thickening along posterior heel   Admitting labs with a leukocytosis of 18, 000, elevated PCT, lactic acidosis, acute kidney injury, temperature of 103 degrees and a urinalysis with too numerous to count white cells.  Blood and urine cultures have been negative to date.  He is currently on Vancomycin and Zosyn and we were consulted for evaluation and treatment.     Past Medical History:   Diagnosis Date   • Diabetes mellitus (CMS/HCC)    • Disease of thyroid gland    • " "Hypertension    • Renal disorder        Past Surgical History:   Procedure Laterality Date   • KNEE SURGERY      \"septic knee\"       History reviewed. No pertinent family history.    Social History     Socioeconomic History   • Marital status:      Spouse name: Not on file   • Number of children: Not on file   • Years of education: Not on file   • Highest education level: Not on file   Social Needs   • Financial resource strain: Not on file   • Food insecurity - worry: Not on file   • Food insecurity - inability: Not on file   • Transportation needs - medical: Not on file   • Transportation needs - non-medical: Not on file   Occupational History   • Not on file   Tobacco Use   • Smoking status: Never Smoker   • Smokeless tobacco: Never Used   Substance and Sexual Activity   • Alcohol use: No     Frequency: Never   • Drug use: Not on file   • Sexual activity: Not on file   Other Topics Concern   • Not on file   Social History Narrative   • Not on file       No Known Allergies      Medication:    Current Facility-Administered Medications:   •  dextrose (D50W) 25 g/ 50mL Intravenous Solution 25 g, 25 g, Intravenous, Q15 Min PRN, Criss Miller APRN  •  dextrose (GLUTOSE) oral gel 15 g, 15 g, Oral, Q15 Min PRN, Criss Miller APRN  •  glucagon (human recombinant) (GLUCAGEN DIAGNOSTIC) injection 1 mg, 1 mg, Subcutaneous, PRN, Criss Miller APRN  •  heparin (porcine) 5000 UNIT/ML injection 5,000 Units, 5,000 Units, Subcutaneous, Q12H, Criss Miller APRN, 5,000 Units at 01/13/19 2130  •  hydrOXYzine (ATARAX) tablet 25 mg, 25 mg, Oral, BID, Criss Miller APRN, 25 mg at 01/13/19 2130  •  insulin lispro (humaLOG) injection 0-7 Units, 0-7 Units, Subcutaneous, 4x Daily With Meals & Nightly, Criss Miller APRN, Stopped at 01/13/19 0732  •  lactobacillus acidophilus (RISAQUAD) capsule 1 capsule, 1 capsule, Oral, Daily, Criss Miller APRN, 1 capsule at 01/13/19 0922  •  " levothyroxine (SYNTHROID, LEVOTHROID) tablet 50 mcg, 50 mcg, Oral, Daily, Criss Miller APRN, 50 mcg at 01/14/19 0617  •  melatonin sublingual tablet 5 mg, 5 mg, Sublingual, Nightly PRN, Criss Miller APRN, 5 mg at 01/13/19 0137  •  ondansetron (ZOFRAN) injection 4 mg, 4 mg, Intravenous, Q6H PRN, Criss Miller APRN, 4 mg at 01/13/19 0539  •  Pharmacy Consult - Pharmacy to dose, , Does not apply, Continuous PRN, Tawny Gleason APRN  •  Pharmacy to dose vancomycin, , Does not apply, Continuous PRN, Criss Miller APRYAMEL  •  piperacillin-tazobactam (ZOSYN) 3.375 g in iso-osmotic dextrose 50 ml (premix), 3.375 g, Intravenous, Q8H, Criss Miller APRN, Stopped at 01/14/19 0511  •  sodium chloride 0.9 % flush 10 mL, 10 mL, Intravenous, PRN, Jacky Dubois MD  •  sodium chloride 0.9 % flush 3 mL, 3 mL, Intravenous, Q12H, Criss Miller APRN, 3 mL at 01/13/19 0137  •  sodium chloride 0.9 % flush 3-10 mL, 3-10 mL, Intravenous, PRN, Criss Miller APRN  •  Sodium chloride 0.9 % infusion, 100 mL/hr, Intravenous, Continuous, Criss Miller APRN, Last Rate: 100 mL/hr at 01/14/19 0559, 100 mL/hr at 01/14/19 0559  •  tamsulosin (FLOMAX) 24 hr capsule 0.8 mg, 0.8 mg, Oral, Nightly, Thee Hernandez MD, 0.8 mg at 01/13/19 2130  •  vancomycin (dosing per levels), , Does not apply, Daily, Sagar Yoder, [facility]    Antibiotics:  Anti-Infectives (From admission, onward)    Ordered     Dose/Rate Route Frequency Start Stop    01/13/19 0121  vancomycin (dosing per levels)     Ordering Provider:  Sagar Yoder [facility]     Does not apply Daily 01/13/19 0900 01/20/19 0859    01/13/19 0116  piperacillin-tazobactam (ZOSYN) 3.375 g in iso-osmotic dextrose 50 ml (premix)     Ordering Provider:  Criss Miller APRN    3.375 g  over 4 Hours Intravenous Every 8 Hours 01/13/19 0200 01/20/19 0159    01/13/19 0116  Pharmacy to dose vancomycin     Ordering Provider:  Angela  HOLLY Billings     Does not apply Continuous PRN 19 0116 19 0115    19 184  piperacillin-tazobactam (ZOSYN) 3.375 g in iso-osmotic dextrose 50 ml (premix)     Ordering Provider:  Jacky Dubois MD    3.375 g  over 30 Minutes Intravenous Once 19 18519 184  vancomycin 2250 mg/500 mL 0.9% NS IVPB ([facility])     Ordering Provider:  Jacky Dubois MD    20 mg/kg × 113 kg Intravenous Once 19 2305            Review of Systems:  Constitutional-- +  Fever, chills or sweats.  Appetite good, and no malaise. generalized weakness   HEENT-- No new vision, hearing or throat complaints.  No epistaxis or oral sores.  Denies odynophagia or dysphagia. No headache, photophobia or neck stiffness.  CV-- No chest pain, palpitation or syncope  Resp-- No SOB/cough/Hemoptysis  GI- +  nausea, and vomiting, no  diarrhea.  No hematochezia, melena, or hematemesis. Denies jaundice or chronic liver disease. + abdominal pain   -- + burning with urination   Lymph- no swollen lymph nodes in neck/axilla or groin.   Heme- No active bruising or bleeding; no Hx of DVT or PE.  MS-- no swelling or pain in the bones or joints of arms/legs.  No new back pain.  Neuro-- No acute focal weakness or numbness in the arms or legs.  No seizures.  Skin--+ left heel decubitus ulcer       Physical Exam:   Vital Signs  Temp (24hrs), Av.2 °F (36.8 °C), Min:97.8 °F (36.6 °C), Max:98.7 °F (37.1 °C)    Temp  Min: 97.8 °F (36.6 °C)  Max: 98.7 °F (37.1 °C)  BP  Min: 88/47  Max: 122/67  Pulse  Min: 71  Max: 98  Resp  Min: 18  Max: 20  SpO2  Min: 94 %  Max: 98 %    GENERAL: Awake and alert, in no acute distress.   HEENT: Normocephalic, atraumatic.  PERRL. EOMI. No conjunctival injection. No icterus. Oropharynx clear without evidence of thrush or exudate. No evidence of peridontal disease.    NECK: Supple without nuchal rigidity. No mass.  LYMPH: No cervical, axillary or inguinal  lymphadenopathy.  HEART: RRR; No murmur, rubs, gallops.   LUNGS: Clear to auscultation bilaterally without wheezing, rales, rhonchi. Normal respiratory effort. Nonlabored. No dullness.  ABDOMEN: Soft, nontender, nondistended. Positive bowel sounds. No rebound or guarding. NO mass or HSM.  EXT:  Right forearm with 3 raised violaceous discolored lesion approx 0.5cm in circumference. . Right knee incision well healed, no redness   : Genitalia generally unremarkable.  Without Morales catheter.  MSK: FROM without joint effusions noted arms/legs.    SKIN: Warm and dry   NEURO: Oriented to PPT. No focal deficits on motor/sensory exam at arms/legs.  Left heel decubitus ulcer approx 6cm in circumference 0.5cm deep with slough-stage III- 4    Laboratory Data    Results from last 7 days   Lab Units  01/13/19   0526  01/12/19   1827   WBC 10*3/mm3  12.30*  18.20*   HEMOGLOBIN g/dL  10.0*  10.6*   HEMATOCRIT %  30.7*  32.3*   PLATELETS 10*3/mm3  221  270     Results from last 7 days   Lab Units  01/14/19   0541   SODIUM mmol/L  133   POTASSIUM mmol/L  3.6   CHLORIDE mmol/L  107   CO2 mmol/L  16.0*   BUN mg/dL  80*   CREATININE mg/dL  2.43*   GLUCOSE mg/dL  73   CALCIUM mg/dL  7.6*     Results from last 7 days   Lab Units  01/12/19   1827   ALK PHOS U/L  74   BILIRUBIN mg/dL  0.2*   ALT (SGPT) U/L  10   AST (SGOT) U/L  13             Results from last 7 days   Lab Units  01/13/19   0040   LACTATE mmol/L  1.6         Results from last 7 days   Lab Units  01/14/19   0541   VANCOMYCIN RM mcg/mL  17.70     Estimated Creatinine Clearance: 28.8 mL/min (A) (by C-G formula based on SCr of 2.43 mg/dL (H)).      Microbiology:  Blood Culture   Date Value Ref Range Status   01/12/2019 No growth at 24 hours  Preliminary   01/12/2019 No growth at 24 hours  Preliminary                    Urine Culture   Date Value Ref Range Status   01/12/2019 No growth at 2 days  Final              Radiology:  Imaging Results (last 72 hours)     Procedure  Component Value Units Date/Time    CT Lower Extremity Left Without Contrast [[id number]] Collected:  01/13/19 0149     Updated:  01/13/19 0256    Narrative:       EXAM:    CT Left Lower Extremity Without IV Contrast, Foot     EXAM DATE/TIME:    1/13/2019 1:49 AM     CLINICAL HISTORY:    83 years old, male; Sepsis, unspecified organism; Pleural effusion, not   elsewhere classified; Constipation, unspecified; Pressure ulcer of left heel,   stage 3; Urinary tract infection, site not specified; Other ascites; Fever,   unspecified; Pain; Foot; Additional info: Left foot wound, R/O osteo     TECHNIQUE:    CT of the Left lower extremity without intravenous contrast was performed.   Exam focused on the foot.    All CT scans at this facility use at least one of these dose optimization   techniques: automated exposure control; mA and/or kV adjustment per patient   size (includes targeted exams where dose is matched to clinical indication); or   iterative reconstruction.    Coronal and sagittal reformatted images were created and reviewed.     COMPARISON:    No relevant prior studies available.     FINDINGS:    Bones/joints:  Inferior and superior calcaneal osteophytes. DJD of ankle   joint, subtalar joint, multiple mid-tarsal joints, multiple MTP joints, several   IP joints of toes. Some generalized osseous demineralization. Some scattered   degenerative subarticular cysts of ankle and multiple bones of the foot. No   obvious large osseous erosions, but subtle acute demineralization abnormality   could be obscured. If further study is desired, then consider MRI with/without   IV contrast. No dislocation or obvious acute fracture. Degenerative changes and   some scattered demineralization of right ankle and foot, as visualized. Hallux   valgus- metatarsus varus deformity.    Soft tissues:  Skin ulcer with skin irregularity, granular calcifications, and   thickening along posterior heel. Edema/swelling of left ankle and foot.  Cannot   exclude cellulitis at heel.  Correlation with CT contrast study or ultrasound   could help rule out any small fluid pocket within the swollen soft tissues of   the heel-distal Achilles region, if desired..  Achilles tendon appears grossly   continuous, but MR correlation would be more precise.    Vasculature:  Atherosclerotic placques scattered along some vessels.       Impression:       1. Skin ulcer with skin irregularity and thickening along posterior heel.    Cannot exclude cellulitis. See above.  2. Soft tissue swelling of ankle and foot.   3. Inferior and superior calcaneal osteophytes.   4. DJD of ankle joint, subtalar joint, multiple mid-tarsal joints, multiple MTP   joints, several IP joints of toes.   5. Some generalized osseous demineralization.  Also, see above.  Sheath   6. No dislocation or obvious acute fracture.   7. Hallux valgus- metatarsus varus deformity.   8. Scattered atherosclerosis.  9.  Additional findings, as above.     THIS DOCUMENT HAS BEEN ELECTRONICALLY SIGNED BY HOLDEN BACON MD    CT Abdomen Pelvis Without Contrast [[id number]] Collected:  01/12/19 1945     Updated:  01/12/19 2153    Addenda:        Note: Critical Findings were discussed with Jacky Roy at 1/12/2019   9:49   PM EST. The report was understood and acknowledged by the healthcare   giver, who   stated patient has elevated WBCs, no history of recent fall or   anticoagulants.    THIS DOCUMENT HAS BEEN ELECTRONICALLY SIGNED BY HOLDEN BACON MD  Signed:  01/12/19 2153 by Holden Bacon MD    Narrative:       EXAM:    CT Abdomen and Pelvis Without Contrast     EXAM DATE/TIME:    1/12/2019 7:45 PM     CLINICAL HISTORY:    83 years old, male; Pain; Abdominal pain; Generalized; Prior surgery;   Additional info: Abdominal distention with vomiting and fever     TECHNIQUE:    Axial computed tomography images of the abdomen and pelvis without contrast.    All CT scans at this facility use at least one of  these dose optimization   techniques: automated exposure control; mA and/or kV adjustment per patient   size (includes targeted exams where dose is matched to clinical indication); or   iterative reconstruction.    Coronal reformatted images were created and reviewed.     COMPARISON:    No relevant prior studies available.     FINDINGS:    Lower thorax:  Old granulomatous disease of lung(s). There are coronary artery   atheromatous calcifications, consistent with CAD. Dystrophic calcifications on   the mitral anulus. Normal heart size. Moderate right pleural effusion. Possible   tiny noncalcified 2 mm nodules versus vessels at lateral right lower lobe   base.( For patients at low risk (minimal or absent history of smoking and of   other known risk factors), no routine follow-up is indicated. For patients at   high risk (history of smoking or of other known risk factors), consider   optional CT at 12 months. (Magy et al., Fleischner Society, 2017).).    Scattered bibasilar subsegmental atelectasis. Mild RLL compressive atelectasis   and/or infiltrate. Some punctate high density possible debris or additional   calcifications in base of right lower lobe. Small hiatal hernia.     ABDOMEN:    Liver:  Evidence of old granulomatous disease of the liver. No hepatomegaly.    Gallbladder and bile ducts: Unremarkable. No calcified stones. No ductal   dilation.    Pancreas:  Pancreatic atrophy.    Spleen: Vascular calcifications. No splenomegaly.    Adrenals:  Borderline in fullness of bilateral adrenals.    Kidneys and ureters:  Mild bilateral perinephric cobwebs/edema. Mild left   renal cortical scarring. No hydronephrosis or nephrolithiasis. No obstructive   uropathy.    Stomach and bowel:  Moderate fecal ball in distended rectum with mild wall   thickening bordered by mild edema, suspect mild stercoral colitis from mild   constipation. There is at least a moderate amount of colonic feces. Gas   scattered in nondilated  small bowel. Nonspecific bowel gas. Moderate distention   of stomach containing air and fluid.    Appendix:  Appendix obscured.    Retroperitoneal space:  Oval 3.9 cm x 6.7 cm x 9.1 cm soft tissue density in   right retroperitoneum posterior to the right kidney-parapsoas region bordered   by hazy  density/edema could represent fluid collection such as hematoma or   abscess. No bubbles of air within this abnormal area. Possible slight swelling   of right psoas muscle. Ultrasound may be helpful to confirm suspected internal   fluid. Clinically correlate. Followup recommended.     PELVIS:    Bladder:  Subtotally contracted bladder restricts wall evaluation. No calculi.    Reproductive:  Enlarged prostate with transverse diameter of 5.7 cm.    Subperitoneal space:  Mild perirectal edema.     ABDOMEN and PELVIS:    Intraperitoneal space:  No significant ascites.    Bones/joints:  Mild scoliosis. Degenerative changes of the spine. DJD of   lateral hips. Mild DJD of bilateral sacroiliac joints. Chronic fracture L2   vertebral body.    Soft tissues:  Mild anasarca. Small 1.1 cm patchy or nodular density in   subcutaneous fat flank RLQ. Scattered areas of mild skin thickening along the   anterior pelvis. Mild laxity of right flank abdominal wall. Mild anasarca.   Slightly more prominent edema or contusion in right flank subcutaneous fat.    Vasculature:  Atherosclerotic placques scattered along some vessels. No aortic   aneurysm. Lower abdominal aortic stent present.    Lymph nodes:  Calcified granulomata within mediastinal lymph node(s) from old   granulomatous disease.  No enlarged pelvic or abdominal lymph nodes.      Impression:       1. CAD.   2. Moderate right pleural effusion.   3. Bibasilar subsegmental atelectasis. Mild RLL compressive atelectasis and/or   infiltrate.   4. Constipation with mild rectal stercoral colitis.   5. Prostate enlargement.   6. Small hiatal hernia.   7. Pancreatic atrophy.   8. Nonspecific  bowel gas pattern.   9. Right retroperitoneal 3.9 cm x 6.7 cm x 9.1 cm oval abnormality, possible   fluid collection such as hematoma or abscess. See above.   10. Mild anasarca. Slightly more prominent edema or contusion in right flank   subcutaneous fat.   11.  Suspect mild swelling of the right psoas muscle.  12.  Additional findings, as above.     THIS DOCUMENT HAS BEEN ELECTRONICALLY SIGNED BY HOLDEN BACON MD        I read his CT scan    Impression:   1.  Severe sepsis- with leukocytosis, elevated PCT, lactic acidosis, acute kidney injury, fever and a known focus of infection.  I suspect that his sepsis is secondary to a urinary tract infection.  2.  Pyuria/UTI-he has yeast on urinalysis and his UTI could be secondary to Candida.  3.  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  We can certainly not exclude a retroperitoneal abscess and if he fails to improve, we will need to consider aspiration of this area.  4.  Left heel decubitus ulcer  5.  Right knee septic arthritis, s/p I & D  6.  Malignant melanoma right arm  7.  Diabetes Mellitus, type 2  8.  Acute/chronic kidney disease, stage III  9.  Severe Debility   10.  Acute kidney injury/ATN    PLAN/RECOMMENDATIONS:   Thank you for asking us to see Leo Elias, I recommend the followin.  Discontinue Vancomycin  2.  Discontinue Zosyn   3.  Consider   MRI left heel  rule out osteomyelitis  4.  Blood cultures and urine cultures  5.  Obtain cultures and records from [facility]   6.  Cefepime IV  7.  Fluconazole IV    Dr. Wolf has obtained the history, performed the physical exam and formulated the above treatment plan.     Dr. Cabral will see him tomorrow.  I discussed his situation with Dr. Cabral today.       Giovanni Wolf MD  2019  8:58 AM

## 2019-01-14 NOTE — PROGRESS NOTES
Discharge Planning Assessment  [facility]     Patient Name: Leo Elias  MRN: [id number]  Today's Date: 1/14/2019    Admit Date: 1/12/2019    Discharge Needs Assessment     Row Name 01/14/19 1323       Living Environment    Lives With  spouse    Current Living Arrangements  home/apartment/condo    Primary Care Provided by  spouse/significant other    Provides Primary Care For  no one, unable/limited ability to care for self    Family Caregiver if Needed  spouse    Quality of Family Relationships  helpful;involved;supportive    Able to Return to Prior Arrangements  yes       Resource/Environmental Concerns    Resource/Environmental Concerns  none    Transportation Concerns  car, none       Transition Planning    Patient/Family Anticipates Transition to  home with help/services    Patient/Family Anticipated Services at Transition  home health care    Transportation Anticipated  health plan transportation       Discharge Needs Assessment    Readmission Within the Last 30 Days  no previous admission in last 30 days    Concerns to be Addressed  discharge planning    Equipment Currently Used at Home  hospital bed    Anticipated Changes Related to Illness  none    Equipment Needed After Discharge  none    Discharge Facility/Level of Care Needs  home with home health    Current Discharge Risk  chronically ill;dependent with mobility/activities of daily living;physical impairment        Discharge Plan     Row Name 01/14/19 1326       Plan    Plan  Home with spouse + [facility] (resume)    Patient/Family in Agreement with Plan  yes    Plan Comments Met with patient and spouse at bedside to initiate discharge planning. Patient slept through meeting. They live in a home in [place] [facility] [place]. Patient is bed-bound at baseline. Per wife, he does not transfer to wheelchair but he is able to feed himself and roll in the bed at baseline.     Current with [facility] for skilled nursing for wound care to his  left heel pressure ulcer (confirmed this with Verónica with [facility]; dx's are pressure ulcer, DM, CKD, and A-fib). Per wife, HH nurse comes out Monday, Wednesday, and Friday to assist with the daily left heel dressing change. Wife's goal is for patient to return home with her at discharge.     She says they have been to several SNFs in the past and prefer home. She has paid caregivers through [employer] that come in 4 hours per day on Saturday and Sunday (cannot afford much more than that). Patient is followed by the in-home Palliative care program/Dr. Mejias. Will need an ambulance for transport home at D/C. CM will continue to follow. Trinidad Cabello RN [phone]    Final Discharge Disposition Code  06 - home with home health care        Destination      No service coordination in this encounter.      Durable Medical Equipment      No service coordination in this encounter.      Dialysis/Infusion      No service coordination in this encounter.      Home Medical Care      No service coordination in this encounter.      Community Resources      No service coordination in this encounter.          Demographic Summary     Row Name 01/14/19 1322       General Information    Arrived From  emergency department    Referral Source  admission list    Preferred Language  English        Functional Status     Row Name 01/14/19 1323       Functional Status    Usual Activity Tolerance  poor    Current Activity Tolerance  poor       Functional Status, IADL    Medications  completely dependent    Meal Preparation  completely dependent    Housekeeping  completely dependent    Laundry  completely dependent    Shopping  completely dependent       Employment/    Employment/ Comments  Medicare [employer] and [place] Medicare supplement with Rx coverage through [facility]        Psychosocial    No documentation.       Abuse/Neglect    No documentation.       Legal    No documentation.       Substance Abuse    No  documentation.       Patient Forms    No documentation.           Trinidad Cabello RN

## 2019-01-14 NOTE — PROGRESS NOTES
[facility] Medicine Services  PROGRESS NOTE    Patient Name: Leo Elias  : 1935  MRN: [id number]    Date of Admission: 2019  Length of Stay: 1  Primary Care Physician: Nydia Mejias MD    Subjective   Subjective     CC:  Fever of 103 on admission    HPI:  Wife present.  Left heel wound wrapped.  Deep.  Discussed MRI with patient and wife.  She doesn't think is any reason he cannot have MRI.  Wife Cate in room today.      Review of Systems    Gen- No fevers, chills  CV- No chest pain, palpitations  Resp- No cough, dyspnea  GI- No N/V/D, abd pain    Otherwise ROS is negative except as mentioned in the HPI.    Objective   Objective     Vital Signs:   Temp:  [97.8 °F (36.6 °C)-98.7 °F (37.1 °C)] 98.2 °F (36.8 °C)  Heart Rate:  [75-98] 75  Resp:  [18] 18  BP: (102-122)/(51-86) 122/67     Physical Exam:    Constitutional: No acute distress, awake, alert  HENT: NCAT, mucous membranes moist  Respiratory: Clear to auscultation bilaterally, respiratory effort normal   Cardiovascular: RRR, no murmurs, rubs, or gallops, palpable pedal pulses bilaterally  Gastrointestinal: Positive bowel sounds, soft, nontender, nondistended  Musculoskeletal: no edema, left ankle cellulitis with heel ulcer  Psychiatric: Appropriate affect, cooperative  Neurologic: Oriented x 3, strength symmetric in all extremities, Cranial Nerves grossly intact to confrontation, speech clear  Skin: No rashes    Results Reviewed:  I have personally reviewed current lab, radiology, and data and agree.    Results from last 7 days   Lab Units  19   0526  19   1827   WBC 10*3/mm3  12.30*  18.20*   HEMOGLOBIN g/dL  10.0*  10.6*   HEMATOCRIT %  30.7*  32.3*   PLATELETS 10*3/mm3  221  270     Results from last 7 days   Lab Units  19   0541  19   0526  19   1827   SODIUM mmol/L  133  133  132   POTASSIUM mmol/L  3.6  4.0  3.8   CHLORIDE mmol/L  107  106  104   CO2 mmol/L  16.0*  18.0*  18.0*    BUN mg/dL  80*  80*  86*   CREATININE mg/dL  2.43*  2.31*  2.38*   GLUCOSE mg/dL  73  95  170*   CALCIUM mg/dL  7.6*  8.2*  8.4*   ALT (SGPT) U/L   --    --   10   AST (SGOT) U/L   --    --   13     Estimated Creatinine Clearance: 28.8 mL/min (A) (by C-G formula based on SCr of 2.43 mg/dL (H)).    No results found for: BNP    Microbiology Results Abnormal     Procedure Component Value - Date/Time    Urine Culture - Urine, Urine, Catheter In/Out [[id number]]  (Normal) Collected:  01/12/19 1921    Lab Status:  Final result Specimen:  Urine, Catheter In/Out Updated:  01/14/19 0746     Urine Culture No growth at 2 days    Blood Culture - Blood, Arm, Right [[id number]] Collected:  01/12/19 1904    Lab Status:  Preliminary result Specimen:  Blood from Arm, Right Updated:  01/13/19 2000     Blood Culture No growth at 24 hours    Blood Culture - Blood, Arm, Left [[id number]] Collected:  01/12/19 1904    Lab Status:  Preliminary result Specimen:  Blood from Arm, Left Updated:  01/13/19 2000     Blood Culture No growth at 24 hours    Eosinophil Smear - Urine, Urine, Clean Catch [[id number]]  (Normal) Collected:  01/13/19 1846    Lab Status:  Final result Specimen:  Urine, Clean Catch Updated:  01/13/19 1955     Eosinophil Smear 0 % EOS/100 Cells     Narrative:       No eosinophil seen    Influenza A & B, RT PCR - Swab, Nasopharynx [[id number]]  (Normal) Collected:  01/12/19 1842    Lab Status:  Final result Specimen:  Swab from Nasopharynx Updated:  01/1935     Influenza A PCR Not Detected     Influenza B PCR Not Detected        Imaging Results (last 24 hours)     ** No results found for the last 24 hours. **           I have reviewed the medications:    Current Facility-Administered Medications:   •  [START ON 1/15/2019] cefepime (MAXIPIME) 2 g/100 mL 0.9% NS (mbp), 2 g, Intravenous, Q24H, Jericho Rogel, [facility]  •  dextrose (D50W) 25 g/ 50mL Intravenous Solution 25 g, 25 g, Intravenous, Q15 Min CARLITOSN, Criss Miller,  APRN  •  dextrose (GLUTOSE) oral gel 15 g, 15 g, Oral, Q15 Min PRN, Criss Miller APRN  •  fluconazole (DIFLUCAN) IVPB 200 mg, 200 mg, Intravenous, Daily, Giovanni Wolf MD, 200 mg at 01/14/19 0926  •  glucagon (human recombinant) (GLUCAGEN DIAGNOSTIC) injection 1 mg, 1 mg, Subcutaneous, PRN, Criss Miller APRN  •  heparin (porcine) 5000 UNIT/ML injection 5,000 Units, 5,000 Units, Subcutaneous, Q12H, Criss Miller APRN, 5,000 Units at 01/14/19 0904  •  hydrOXYzine (ATARAX) tablet 25 mg, 25 mg, Oral, BID, Criss Miller APRN, 25 mg at 01/14/19 0904  •  insulin lispro (humaLOG) injection 0-7 Units, 0-7 Units, Subcutaneous, 4x Daily With Meals & Nightly, Criss Miller APRN, Stopped at 01/13/19 0732  •  lactobacillus acidophilus (RISAQUAD) capsule 1 capsule, 1 capsule, Oral, Daily, Criss Miller APRN, 1 capsule at 01/14/19 0904  •  levothyroxine (SYNTHROID, LEVOTHROID) tablet 50 mcg, 50 mcg, Oral, Daily, Criss Miller APRN, 50 mcg at 01/14/19 0617  •  melatonin sublingual tablet 5 mg, 5 mg, Sublingual, Nightly PRN, Criss Miller APRN, 5 mg at 01/13/19 0137  •  ondansetron (ZOFRAN) injection 4 mg, 4 mg, Intravenous, Q6H PRN, Criss Miller APRYAMEL, 4 mg at 01/13/19 0539  •  Pharmacy Consult - Pharmacy to dose, , Does not apply, Continuous PRN, Tawny Gleason APRN  •  sodium chloride 0.9 % flush 10 mL, 10 mL, Intravenous, PRN, Jacky Dubois MD  •  sodium chloride 0.9 % flush 3 mL, 3 mL, Intravenous, Q12H, Criss Miller APRN, 3 mL at 01/14/19 0905  •  sodium chloride 0.9 % flush 3-10 mL, 3-10 mL, Intravenous, PRN, Criss Miller APRN  •  Sodium chloride 0.9 % infusion, 100 mL/hr, Intravenous, Continuous, Criss Miller APRN, Last Rate: 100 mL/hr at 01/14/19 0559, 100 mL/hr at 01/14/19 0559  •  tamsulosin (FLOMAX) 24 hr capsule 0.8 mg, 0.8 mg, Oral, Nightly, Thee Hernandez MD, 0.8 mg at 01/13/19  2130      Assessment/Plan   Assessment / Plan     Active Hospital Problems    Diagnosis Date Noted   • **Sepsis, unspecified organism (CMS/[facility]) [A41.9] 01/12/2019   • Hypocalcemia [E83.51] 01/12/2019   • Acute renal failure superimposed on stage 3 chronic kidney disease (CMS/[facility]) [N17.9, N18.3] 01/12/2019   • Sepsis due to urinary tract infection (CMS/[facility]) [A41.9, N39.0] 01/12/2019   • Type 2 diabetes mellitus (CMS/[facility]) [E11.9] 01/12/2019   • Hypothyroidism (acquired) [E03.9] 01/12/2019   • Essential hypertension [I10] 01/12/2019   • Wound healing, delayed, left foot [T14.8XXD] 01/12/2019   • Acute UTI (urinary tract infection) [N39.0] 01/12/2019     Brief Hospital Course to date:  Leo Elias is a 83 y.o. male with severe sepsis POA    83 year old male presents to the ED accompanied by wife with complaints of nausea/vomiting and fever that began around 3 am this morning.      1) Sepsis per criteria  -tmax: 103, WBC  18.20, lactic acid 2.3, procal 3.14  -source not completely clear but has multiple potential sources: suspect CT of abdomen and pelvis concerning for retroperitoneal abscess vs hematoma, patient has chronic non-healing left foot wound  and underlying UTI.  -will continue empiric vancomycin and zosyn  -may need further assistance from ID   -blood cultures pending  -urine cultures pending  -continue IVF     2) Retroperitoneal abscess vs hematoma  -noted on CT of abdomen and pelvis  -H&H stable  -more concerning for abscess with SIRS presentation  -General Surgery Evaluation     3) Acute renal failure superimposed on CKD stage III  -baseline creatinine 1.4-1.7 currently 2.23  -likely multifactorial, recently started on bactrim  -will check urine studies  -hold nephrotoxic medications  -repeat labs in am     4) complicated UTI  -UA as noted above  -continue abx as mentioned above  -urine cultures pending  -follow urine cultures      5) Left foot wound  -being followed by home health with wet to dry dressing  daily  -will get MRI left foot to rule out Osteomyelitis  -consult WOC     6) Type 2 diabetes mellitus   -check hgb a1c  -start ldssi  -fingersticks achs     7) Hypothyroidism  -continue home levothyroxine  -check TSH     8) Hypocalcemia  -check ionized calcium     Discussed case with Dr. Giovanni Wolf in person today  MRI Left Foot/Heel Wound - rule out Osteo    DVT Prophylaxis:  Heparin 5,000 Unit SC every 12 hours    Disposition: I expect the patient to be discharged TBD    CODE STATUS:   Code Status and Medical Interventions:   Ordered at: 01/12/19 6140     Limited Support to NOT Include:    Intubation     Level Of Support Discussed With:    Patient     Code Status:    No CPR     Medical Interventions (Level of Support Prior to Arrest):    Limited     Electronically signed by Thee Hernandez MD, 01/14/19, 2:37 PM.

## 2019-01-14 NOTE — NURSING NOTE
PT seen today for left heel wound pressure injury that will be treated by cleansing with NS, pack with iodiform gauze and a nonadherent dressing, cover with NS moistened hydrofera and a nonadherent dressing daily. Coccyx has old scar tissue present and venelex has been ordered. Pt on a TONY mattress and skin interventions are in place. WOCN will f/u. Call with any questions or concerns.

## 2019-01-14 NOTE — PROGRESS NOTES
"Leo BYERS Howe  1935  [id number]    Surgery Progress Note    Date of visit: 1/14/2019    Subjective: Patient feels better and tolerating diet  No further nausea or vomiting  Wife is at bedside  Apparently he fell in the ICU at  in June following his knee surgery, requiring blood transfusion with concern about right sided hematoma.    Objective:    /67 (BP Location: Left arm, Patient Position: Lying)   Pulse 75   Temp 98.2 °F (36.8 °C) (Oral)   Resp 18   Ht 182.9 cm (72\")   Wt 101 kg (223 lb 8 oz)   SpO2 96%   BMI 30.31 kg/m²     Intake/Output Summary (Last 24 hours) at 1/14/2019 0854  Last data filed at 1/14/2019 0600  Gross per 24 hour   Intake 4000 ml   Output 1200 ml   Net 2800 ml         L: normal air entry    Abd: Nondistended, soft and nontender      LABS:    Results from last 7 days   Lab Units  01/13/19   0526   WBC 10*3/mm3  12.30*   HEMOGLOBIN g/dL  10.0*   HEMATOCRIT %  30.7*   PLATELETS 10*3/mm3  221     Results from last 7 days   Lab Units  01/14/19   0541   01/12/19   1827   SODIUM mmol/L  133   < >  132   POTASSIUM mmol/L  3.6   < >  3.8   CHLORIDE mmol/L  107   < >  104   CO2 mmol/L  16.0*   < >  18.0*   BUN mg/dL  80*   < >  86*   CREATININE mg/dL  2.43*   < >  2.38*   CALCIUM mg/dL  7.6*   < >  8.4*   BILIRUBIN mg/dL   --    --   0.2*   ALK PHOS U/L   --    --   74   ALT (SGPT) U/L   --    --   10   AST (SGOT) U/L   --    --   13   GLUCOSE mg/dL  73   < >  170*    < > = values in this interval not displayed.     Results from last 7 days   Lab Units  01/14/19   0541   SODIUM mmol/L  133   POTASSIUM mmol/L  3.6   CHLORIDE mmol/L  107   CO2 mmol/L  16.0*   BUN mg/dL  80*   CREATININE mg/dL  2.43*   GLUCOSE mg/dL  73   CALCIUM mg/dL  7.6*     No results found for: LIPASE      Assessment/ Plan: Urinary tract infection responding to IV antibiotics  Nausea and vomiting subsided  Right retroperitoneal fluid collection most likely a resolving hematoma from his fall in June  No need for " further workup with CT guided drainage   continue current management  Will follow as needed      Problem List Items Addressed This Visit     Sepsis due to urinary tract infection (CMS/HCC) - Primary      Other Visit Diagnoses     Retroperitoneal fluid collection        Pleural effusion, right        Pressure injury of left heel, stage 3 (CMS/HCC)        Constipation, unspecified constipation type        Acute febrile illness        Impaired mobility and ADLs        Impaired functional mobility, balance, gait, and endurance                Guy Frausto MD  1/14/2019  8:54 AM

## 2019-01-14 NOTE — PLAN OF CARE
Problem: Fall Risk (Adult)  Goal: Absence of Fall  Outcome: Ongoing (interventions implemented as appropriate)   01/14/19 0445   Fall Risk (Adult)   Absence of Fall making progress toward outcome       Problem: Patient Care Overview  Goal: Plan of Care Review  Outcome: Ongoing (interventions implemented as appropriate)   01/14/19 0445   Coping/Psychosocial   Plan of Care Reviewed With patient   OTHER        Plan of Care Review   Progress improving      01/14/19 0445   Coping/Psychosocial   Plan of Care Reviewed With patient   OTHER   Outcome Summary VSS, continue for having urinal retention, performed straight cath with 550 ml output. Large BM after enema. Denies p   01/14/19 0445   Coping/Psychosocial   Plan of Care Reviewed With patient   OTHER   Outcome Summary VSS, continue for having urinal retention, performed straight cath with 550 ml output. Large BM after enema. Denies pain or discomfort. Will continue to monitor.    Plan of Care Review   Progress improving   ain or discomfort. Will continue to monitor.    Plan of Care Review   Progress improving

## 2019-01-15 LAB
ANION GAP SERPL CALCULATED.3IONS-SCNC: 8 MMOL/L (ref 3–11)
BUN BLD-MCNC: 69 MG/DL (ref 9–23)
BUN/CREAT SERPL: 29.9 (ref 7–25)
CALCIUM SPEC-SCNC: 7.1 MG/DL (ref 8.7–10.4)
CHLORIDE SERPL-SCNC: 110 MMOL/L (ref 99–109)
CO2 SERPL-SCNC: 16 MMOL/L (ref 20–31)
CREAT BLD-MCNC: 2.31 MG/DL (ref 0.6–1.3)
DEPRECATED RDW RBC AUTO: 49.9 FL (ref 37–54)
ERYTHROCYTE [DISTWIDTH] IN BLOOD BY AUTOMATED COUNT: 16 % (ref 11.3–14.5)
GFR SERPL CREATININE-BSD FRML MDRD: 27 ML/MIN/1.73
GLUCOSE BLD-MCNC: 118 MG/DL (ref 70–100)
GLUCOSE BLDC GLUCOMTR-MCNC: 115 MG/DL (ref 70–130)
GLUCOSE BLDC GLUCOMTR-MCNC: 142 MG/DL (ref 70–130)
GLUCOSE BLDC GLUCOMTR-MCNC: 211 MG/DL (ref 70–130)
GLUCOSE BLDC GLUCOMTR-MCNC: 239 MG/DL (ref 70–130)
HCT VFR BLD AUTO: 24.8 % (ref 38.9–50.9)
HGB BLD-MCNC: 8.1 G/DL (ref 13.1–17.5)
MCH RBC QN AUTO: 27.5 PG (ref 27–31)
MCHC RBC AUTO-ENTMCNC: 32.7 G/DL (ref 32–36)
MCV RBC AUTO: 84.1 FL (ref 80–99)
PLATELET # BLD AUTO: 130 10*3/MM3 (ref 150–450)
PMV BLD AUTO: 9.1 FL (ref 6–12)
POTASSIUM BLD-SCNC: 3.4 MMOL/L (ref 3.5–5.5)
RBC # BLD AUTO: 2.95 10*6/MM3 (ref 4.2–5.76)
SODIUM BLD-SCNC: 134 MMOL/L (ref 132–146)
WBC NRBC COR # BLD: 4.57 10*3/MM3 (ref 3.5–10.8)

## 2019-01-15 PROCEDURE — 36415 COLL VENOUS BLD VENIPUNCTURE: CPT

## 2019-01-15 PROCEDURE — 80048 BASIC METABOLIC PNL TOTAL CA: CPT

## 2019-01-15 PROCEDURE — 82962 GLUCOSE BLOOD TEST: CPT

## 2019-01-15 PROCEDURE — 25010000002 CEFEPIME 2 G/NS 100 ML SOLUTION

## 2019-01-15 PROCEDURE — 97530 THERAPEUTIC ACTIVITIES: CPT

## 2019-01-15 PROCEDURE — 25010000002 HEPARIN (PORCINE) PER 1000 UNITS: Performed by: NURSE PRACTITIONER

## 2019-01-15 PROCEDURE — 99233 SBSQ HOSP IP/OBS HIGH 50: CPT | Performed by: INTERNAL MEDICINE

## 2019-01-15 PROCEDURE — 85027 COMPLETE CBC AUTOMATED: CPT

## 2019-01-15 PROCEDURE — 25010000002 FLUCONAZOLE PER 200 MG: Performed by: INTERNAL MEDICINE

## 2019-01-15 RX ORDER — FINASTERIDE 5 MG/1
5 TABLET, FILM COATED ORAL DAILY
Status: DISCONTINUED | OUTPATIENT
Start: 2019-01-15 | End: 2019-01-17 | Stop reason: HOSPADM

## 2019-01-15 RX ADMIN — Medication 1 CAPSULE: at 08:19

## 2019-01-15 RX ADMIN — FINASTERIDE 5 MG: 5 TABLET, FILM COATED ORAL at 15:28

## 2019-01-15 RX ADMIN — INSULIN LISPRO 3 UNITS: 100 INJECTION, SOLUTION INTRAVENOUS; SUBCUTANEOUS at 20:48

## 2019-01-15 RX ADMIN — HYDROXYZINE HYDROCHLORIDE 25 MG: 25 TABLET, FILM COATED ORAL at 08:20

## 2019-01-15 RX ADMIN — FLUCONAZOLE, SODIUM CHLORIDE 200 MG: 2 INJECTION INTRAVENOUS at 08:19

## 2019-01-15 RX ADMIN — CASTOR OIL AND BALSAM, PERU: 788; 87 OINTMENT TOPICAL at 08:19

## 2019-01-15 RX ADMIN — TAMSULOSIN HYDROCHLORIDE 0.8 MG: 0.4 CAPSULE ORAL at 20:42

## 2019-01-15 RX ADMIN — HEPARIN SODIUM 5000 UNITS: 5000 INJECTION INTRAVENOUS; SUBCUTANEOUS at 20:42

## 2019-01-15 RX ADMIN — HYDROXYZINE HYDROCHLORIDE 25 MG: 25 TABLET, FILM COATED ORAL at 20:44

## 2019-01-15 RX ADMIN — INSULIN LISPRO 3 UNITS: 100 INJECTION, SOLUTION INTRAVENOUS; SUBCUTANEOUS at 16:38

## 2019-01-15 RX ADMIN — CASTOR OIL AND BALSAM, PERU: 788; 87 OINTMENT TOPICAL at 21:56

## 2019-01-15 RX ADMIN — HEPARIN SODIUM 5000 UNITS: 5000 INJECTION INTRAVENOUS; SUBCUTANEOUS at 08:19

## 2019-01-15 RX ADMIN — SODIUM CHLORIDE, PRESERVATIVE FREE 3 ML: 5 INJECTION INTRAVENOUS at 20:43

## 2019-01-15 RX ADMIN — CEFEPIME 2 G: 2 INJECTION, POWDER, FOR SOLUTION INTRAVENOUS at 08:19

## 2019-01-15 RX ADMIN — CASTOR OIL AND BALSAM, PERU: 788; 87 OINTMENT TOPICAL at 20:42

## 2019-01-15 RX ADMIN — SODIUM CHLORIDE 100 ML/HR: 9 INJECTION, SOLUTION INTRAVENOUS at 20:51

## 2019-01-15 RX ADMIN — LEVOTHYROXINE SODIUM 50 MCG: 50 TABLET ORAL at 05:39

## 2019-01-15 NOTE — PLAN OF CARE
Problem: Patient Care Overview  Goal: Plan of Care Review  Outcome: Ongoing (interventions implemented as appropriate)   01/15/19 0999   Coping/Psychosocial   Plan of Care Reviewed With patient;spouse   OTHER   Outcome Summary Pt required encouragement to participate. Pt declined EOB and grooming activities. Pt min A to roll R/L 2 times. Pt required active assist with BUE ther ex.    Plan of Care Review   Progress no change

## 2019-01-15 NOTE — PLAN OF CARE
Problem: Fall Risk (Adult)  Goal: Identify Related Risk Factors and Signs and Symptoms  Outcome: Ongoing (interventions implemented as appropriate)   01/15/19 0513   Fall Risk (Adult)   Related Risk Factors (Fall Risk) age-related changes;impaired vision   Signs and Symptoms (Fall Risk) presence of risk factors       Problem: Patient Care Overview  Goal: Plan of Care Review  Outcome: Ongoing (interventions implemented as appropriate)   01/15/19 0513   Coping/Psychosocial   Plan of Care Reviewed With patient   OTHER   Outcome Summary VSS, continue unable to urinate. Performed straigh cath w/out complication. Plan for MRI LLE today to R/O osteo. Will continue to monitor.    Plan of Care Review   Progress improving

## 2019-01-15 NOTE — PROGRESS NOTES
[facility] Medicine Services  PROGRESS NOTE    Patient Name: Leo Elias  : 1935  MRN: [id number]    Date of Admission: 2019  Length of Stay: 2  Primary Care Physician: Nydia Mejias MD    Subjective   Subjective     CC: f/u feeling poorly    HPI: Lying in bed with wife at bedside. She is very anxious regarding her husbands multiple diagnoses.     Review of Systems  Gen- No fevers, chills  CV- No chest pain, palpitations  Resp- No cough, dyspnea  GI- No N/V/D, abd pain    Otherwise ROS is negative except as mentioned in the HPI.    Objective   Objective     Vital Signs:   Temp:  [97.6 °F (36.4 °C)-97.8 °F (36.6 °C)] 97.8 °F (36.6 °C)  Heart Rate:  [86-93] 86  Resp:  [16-18] 16  BP: (112-124)/(62-75) 112/62        Physical Exam:  Constitutional: No acute distress, awake, alert, chronically ill appearing  HENT: NCAT, mucous membranes moist  Respiratory: Clear to auscultation bilaterally, respiratory effort normal   Cardiovascular: RRR, no murmurs, rubs, or gallops, palpable pedal pulses bilaterally  Gastrointestinal: Positive bowel sounds, soft, nontender, nondistended  Musculoskeletal: RUE nodular lesions noted. LLE elevated in boot with heel wound dressed.  Psychiatric: Appropriate affect, cooperative  Neurologic: Oriented x 3, strength symmetric in all extremities, Cranial Nerves grossly intact to confrontation, speech clear  Skin: No rashes    Results Reviewed:  I have personally reviewed current lab, radiology, and data and agree.    Results from last 7 days   Lab Units  01/15/19   1152  19   0526  19   1827   WBC 10*3/mm3  4.57  12.30*  18.20*   HEMOGLOBIN g/dL  8.1*  10.0*  10.6*   HEMATOCRIT %  24.8*  30.7*  32.3*   PLATELETS 10*3/mm3  130*  221  270     Results from last 7 days   Lab Units  01/15/19   1152  19   0541  19   0526  19   1827   SODIUM mmol/L  134  133  133  132   POTASSIUM mmol/L  3.4*  3.6  4.0  3.8   CHLORIDE mmol/L   110*  107  106  104   CO2 mmol/L  16.0*  16.0*  18.0*  18.0*   BUN mg/dL  69*  80*  80*  86*   CREATININE mg/dL  2.31*  2.43*  2.31*  2.38*   GLUCOSE mg/dL  118*  73  95  170*   CALCIUM mg/dL  7.1*  7.6*  8.2*  8.4*   ALT (SGPT) U/L   --    --    --   10   AST (SGOT) U/L   --    --    --   13     Estimated Creatinine Clearance: 30.5 mL/min (A) (by C-G formula based on SCr of 2.31 mg/dL (H)).    No results found for: BNP    Microbiology Results Abnormal     Procedure Component Value - Date/Time    Blood Culture - Blood, Arm, Right [[id number]] Collected:  01/12/19 1904    Lab Status:  Preliminary result Specimen:  Blood from Arm, Right Updated:  01/14/19 2000     Blood Culture No growth at 2 days    Blood Culture - Blood, Arm, Left [[id number]] Collected:  01/12/19 1904    Lab Status:  Preliminary result Specimen:  Blood from Arm, Left Updated:  01/14/19 2000     Blood Culture No growth at 2 days    Urine Culture - Urine, Urine, Catheter In/Out [[id number]]  (Normal) Collected:  01/12/19 1921    Lab Status:  Final result Specimen:  Urine, Catheter In/Out Updated:  01/14/19 0746     Urine Culture No growth at 2 days    Eosinophil Smear - Urine, Urine, Clean Catch [[id number]]  (Normal) Collected:  01/13/19 1846    Lab Status:  Final result Specimen:  Urine, Clean Catch Updated:  01/13/19 1955     Eosinophil Smear 0 % EOS/100 Cells     Narrative:       No eosinophil seen    Influenza A & B, RT PCR - Swab, Nasopharynx [[id number]]  (Normal) Collected:  01/12/19 1842    Lab Status:  Final result Specimen:  Swab from Nasopharynx Updated:  01/1935     Influenza A PCR Not Detected     Influenza B PCR Not Detected        CT A/P reviewed personally with patient's wife with constipation and right flank fluid collection. No specific evidence of malignancy. Agree with interpretation.        I have reviewed the medications:    Current Facility-Administered Medications:   •  castor oil-balsam peru (VENELEX) ointment, , Topical,  Q12H, Thee Hernandez MD  •  castor oil-balsam peru (VENELEX) ointment, , Topical, PRN, Thee Hernandez MD  •  cefepime (MAXIPIME) 2 g/100 mL 0.9% NS (mbp), 2 g, Intravenous, Q24H, Jericho Rogel, RPH, 2 g at 01/15/19 0819  •  dextrose (D50W) 25 g/ 50mL Intravenous Solution 25 g, 25 g, Intravenous, Q15 Min PRN, Criss Miller APRN  •  dextrose (GLUTOSE) oral gel 15 g, 15 g, Oral, Q15 Min PRN, Criss Miller APRN  •  finasteride (PROSCAR) tablet 5 mg, 5 mg, Oral, Daily, Trinidad Serrano II, DO  •  fluconazole (DIFLUCAN) IVPB 200 mg, 200 mg, Intravenous, Daily, Giovanni Wolf MD, 200 mg at 01/15/19 0819  •  glucagon (human recombinant) (GLUCAGEN DIAGNOSTIC) injection 1 mg, 1 mg, Subcutaneous, PRN, Criss Miller APRN  •  heparin (porcine) 5000 UNIT/ML injection 5,000 Units, 5,000 Units, Subcutaneous, Q12H, Criss Miller APRN, 5,000 Units at 01/15/19 0819  •  hydrOXYzine (ATARAX) tablet 25 mg, 25 mg, Oral, BID, Criss Miller APRN, 25 mg at 01/15/19 0820  •  insulin lispro (humaLOG) injection 0-7 Units, 0-7 Units, Subcutaneous, 4x Daily With Meals & Nightly, Criss Miller APRN, 2 Units at 01/14/19 2118  •  lactobacillus acidophilus (RISAQUAD) capsule 1 capsule, 1 capsule, Oral, Daily, Criss Miller APRN, 1 capsule at 01/15/19 0819  •  levothyroxine (SYNTHROID, LEVOTHROID) tablet 50 mcg, 50 mcg, Oral, Daily, Criss Miller APRN, 50 mcg at 01/15/19 0539  •  melatonin sublingual tablet 5 mg, 5 mg, Sublingual, Nightly PRN, Criss Miller, APRN, 5 mg at 01/13/19 0137  •  ondansetron (ZOFRAN) injection 4 mg, 4 mg, Intravenous, Q6H PRN, Criss Miller APRN, 4 mg at 01/13/19 0539  •  Pharmacy Consult - Pharmacy to dose, , Does not apply, Continuous PRN, Tawny Gleason APRN  •  sodium chloride 0.9 % flush 10 mL, 10 mL, Intravenous, PRN, Jacky Dubois MD  •  sodium chloride 0.9 % flush 3 mL, 3 mL, Intravenous, Q12H, Criss Miller,  APRN, 3 mL at 01/14/19 0905  •  sodium chloride 0.9 % flush 3-10 mL, 3-10 mL, Intravenous, PRN, Criss Miller, APRN  •  Sodium chloride 0.9 % infusion, 100 mL/hr, Intravenous, Continuous, Criss Miller APRYAMEL, Last Rate: 100 mL/hr at 01/14/19 1843, 100 mL/hr at 01/14/19 1843  •  tamsulosin (FLOMAX) 24 hr capsule 0.8 mg, 0.8 mg, Oral, Nightly, Thee Hernandez MD, 0.8 mg at 01/14/19 2116      Assessment/Plan   Assessment / Plan     Active Hospital Problems    Diagnosis Date Noted   • **Sepsis, unspecified organism (CMS/[facility]) [A41.9] 01/12/2019   • Hypocalcemia [E83.51] 01/12/2019   • Acute renal failure superimposed on stage 3 chronic kidney disease (CMS/[facility]) [N17.9, N18.3] 01/12/2019   • Sepsis due to urinary tract infection (CMS/[facility]) [A41.9, N39.0] 01/12/2019   • Type 2 diabetes mellitus (CMS/[facility]) [E11.9] 01/12/2019   • Hypothyroidism (acquired) [E03.9] 01/12/2019   • Essential hypertension [I10] 01/12/2019   • Wound healing, delayed, left foot [T14.8XXD] 01/12/2019   • Acute UTI (urinary tract infection) [N39.0] 01/12/2019       Brief Hospital Course to date:  Leo Elias is a 83 y.o. male chronically ill male presenting with sepsis presumably due to UTI.    A/P:  --Long d/w patient's wife today. At this time it is most likely that patient's presentation due to UTI (having ongoing retention.) Continue IV cefepime + fluconazole. Needs MRI left foot to r/o osteomyelitis which is pending.  --Per AJ's note, it is unlikely that his flank hematoma is infected though this cannot be excluded. If he continues to have fevers/symptoms despite treatment of above, this may need to be readdressed.  --Also of note patient has multiple nodular lesions of his RUE (some of which may have resolved per his wife.) At one point, he was told these were sarcoma but apparently at [facility] he was told these are melanoma. Awaiting records from [facility] though it will not  as patient and his wife would wish for no  treatment in either case.  --Creatinine at baseline.  --[place] mccullough for retention probably related to UTI. Continue flomax. Add proscar.  --Blood glucose controlled. Continue current regimen.  --PT wound.    DVT Prophylaxis: SQH    Disposition: I expect the patient to be discharged home in 2-4 days.    CODE STATUS:   Code Status and Medical Interventions:   Ordered at: 01/12/19 0687     Limited Support to NOT Include:    Intubation     Level Of Support Discussed With:    Patient     Code Status:    No CPR     Medical Interventions (Level of Support Prior to Arrest):    Limited         Electronically signed by Trinidad Serrano II, DO, 01/15/19, 1:14 PM.

## 2019-01-15 NOTE — PROGRESS NOTES
"Leo Elias  1935  [id number]  1/15/2019    CC:   Chief Complaint   Patient presents with   • Fever       Leo Elias is a 83 y.o. male here for cc fever    History of present illness:    Patient is a 83 y.o. male seen today for sepsis.  He is a previous patient of Dr. Erasmo Cabral with multiple underlying medical problems.  He had been complaining of burning with urination and abdominal pain for approximately one week, and began vomiting Saturday, was febrile,  and was brought to the ED.  His wife states that he was given Flagyl for his left heel decubitus ulcer on Thursday.   He has a history of right knee septic arthritis, and was treated for a \"long time.   He underwent a repeat I & D in June at , due to persistent pain,  and then went to rehab where he developed the ulcer treated through home health.  He has also been diagnosed with malignant melanoma on his right arm at .   While in rehab,  and had fallen on the floor, taken to   where they noted a right retroperitoneal hematoma, and severe anemia, for which he was transfused   When he became ill on Saturday, his wife wanted him to be brought here and \"figure it all out\"   An abdominal Ct revealed a right retroperitoneal 3.9 x 9.1 cm oval abnormality, possible fluid collection such as hematoma or abscess.  CT of left lower extremity skin ulcer with skin irregularity and thickening along posterior heel   Admitting labs with a leukocytosis of 18, 000, elevated PCT, lactic acidosis, acute kidney injury, temperature of 103 degrees and a urinalysis with too numerous to count white cells.  Blood and urine cultures have been negative to date.  He is currently on Vancomycin and Zosyn and we were consulted for evaluation and treatment.      1/15/19 - No Hx available   ROS discussed with staff and family.  Had fever and heel ulcer.  For MRI         Past medical history:  Past Medical History:   Diagnosis Date   • Diabetes mellitus (CMS/HCC)    • Disease of thyroid " gland    • Hypertension    • Renal disorder        Medications:   Current Facility-Administered Medications:   •  castor oil-balsam peru (VENELEX) ointment, , Topical, Q12H, Thee Hernandez MD  •  castor oil-balsam peru (VENELEX) ointment, , Topical, PRN, Thee Hernandez MD  •  cefepime (MAXIPIME) 2 g/100 mL 0.9% NS (mbp), 2 g, Intravenous, Q24H, Jericho Rogel, RPH, 2 g at 01/15/19 0819  •  dextrose (D50W) 25 g/ 50mL Intravenous Solution 25 g, 25 g, Intravenous, Q15 Min PRN, Criss Miller APRN  •  dextrose (GLUTOSE) oral gel 15 g, 15 g, Oral, Q15 Min PRN, Criss Miller APRN  •  fluconazole (DIFLUCAN) IVPB 200 mg, 200 mg, Intravenous, Daily, Giovanni Wolf MD, 200 mg at 01/15/19 0819  •  glucagon (human recombinant) (GLUCAGEN DIAGNOSTIC) injection 1 mg, 1 mg, Subcutaneous, PRN, Criss Miller APRN  •  heparin (porcine) 5000 UNIT/ML injection 5,000 Units, 5,000 Units, Subcutaneous, Q12H, Criss Miller APRN, 5,000 Units at 01/15/19 0819  •  hydrOXYzine (ATARAX) tablet 25 mg, 25 mg, Oral, BID, Criss Miller APRN, 25 mg at 01/15/19 0820  •  insulin lispro (humaLOG) injection 0-7 Units, 0-7 Units, Subcutaneous, 4x Daily With Meals & Nightly, Criss Miller APRN, 2 Units at 01/14/19 2118  •  lactobacillus acidophilus (RISAQUAD) capsule 1 capsule, 1 capsule, Oral, Daily, Criss Miller APRN, 1 capsule at 01/15/19 0819  •  levothyroxine (SYNTHROID, LEVOTHROID) tablet 50 mcg, 50 mcg, Oral, Daily, Criss Miller APRN, 50 mcg at 01/15/19 0539  •  melatonin sublingual tablet 5 mg, 5 mg, Sublingual, Nightly PRN, Criss Miller APRN, 5 mg at 01/13/19 0137  •  ondansetron (ZOFRAN) injection 4 mg, 4 mg, Intravenous, Q6H PRN, Criss Miller APRN, 4 mg at 01/13/19 0539  •  Pharmacy Consult - Pharmacy to dose, , Does not apply, Continuous PRN, Tawny Gleason APRN  •  sodium chloride 0.9 % flush 10 mL, 10 mL, Intravenous, PRN, Jacky Dubois,  "MD  •  sodium chloride 0.9 % flush 3 mL, 3 mL, Intravenous, Q12H, Criss Miller, APRN, 3 mL at 01/14/19 0905  •  sodium chloride 0.9 % flush 3-10 mL, 3-10 mL, Intravenous, PRN, Criss Miller, APRYAMEL  •  Sodium chloride 0.9 % infusion, 100 mL/hr, Intravenous, Continuous, Criss Miller APRN, Last Rate: 100 mL/hr at 01/14/19 1843, 100 mL/hr at 01/14/19 1843  •  tamsulosin (FLOMAX) 24 hr capsule 0.8 mg, 0.8 mg, Oral, Nightly, Thee Hernandez MD, 0.8 mg at 01/14/19 2116  Antibiotics:  Anti-Infectives (From admission, onward)    Ordered     Dose/Rate Route Frequency Start Stop    01/14/19 0908  cefepime (MAXIPIME) 2 g/100 mL 0.9% NS (mbp)     Ordering Provider:  Jericho Rogel RPH    2 g  over 4 Hours Intravenous Every 24 Hours 01/15/19 0900 01/22/19 0859    01/14/19 0907  fluconazole (DIFLUCAN) IVPB 200 mg     Ordering Provider:  Giovanni Wolf MD    200 mg  over 60 Minutes Intravenous Daily 01/14/19 1000 01/21/19 0859    01/14/19 0908  cefepime (MAXIPIME) 2 g/100 mL 0.9% NS (mbp)     Ordering Provider:  Jericho Rogel RPH    2 g  200 mL/hr over 30 Minutes Intravenous Once 01/14/19 1000 01/14/19 0956    01/12/19 1842  piperacillin-tazobactam (ZOSYN) 3.375 g in iso-osmotic dextrose 50 ml (premix)     Ordering Provider:  Jacky Dubois MD    3.375 g  over 30 Minutes Intravenous Once 01/12/19 1855 01/12/19 2033    01/12/19 1842  vancomycin 2250 mg/500 mL 0.9% NS IVPB ([facility])     Ordering Provider:  Jacky Dubois MD    20 mg/kg × 113 kg Intravenous Once 01/12/19 1855 01/12/19 2305          Allergies:  has No Known Allergies.    Family History: family history is not on file.    Social History:  reports that  has never smoked. he has never used smokeless tobacco. He reports that he does not drink alcohol.    Review of Systems - not available    Blood pressure 112/62, pulse 86, temperature 97.8 °F (36.6 °C), temperature source Axillary, resp. rate 16, height 182.9 cm (72\"), weight 102 kg " (223 lb 12.8 oz), SpO2 96 %.  GENERAL: Obtunded, in no acute distress.   HEENT: Oropharynx without thrush. . No cervical adenopathy. No neck masses  EYES: . No conjunctival injection. No icterus.   LYMPHATICS: No lymphadenopathy of the neck   HEART: No murmur, gallop, or pericardial friction rub.   LUNGS: Clear to auscultation anteriorly. No respiratory distress  ABDOMEN: Soft, nontender, nondistended.   SKIN: Warm and dry  -  Let arm with nodular lesions  PSYCHIATRIC: Mental status confused  EXT: No cellulitic changes      DIAGNOSTICS:  Lab Results   Component Value Date    WBC 12.30 (H) 01/13/2019    HGB 10.0 (L) 01/13/2019    HCT 30.7 (L) 01/13/2019     01/13/2019     Lab Results   Component Value Date    .4 (H) 06/30/2014     No results found for: SEDRATE  Lab Results   Component Value Date    GLUCOSE 73 01/14/2019    BUN 80 (H) 01/14/2019    CREATININE 2.43 (H) 01/14/2019    EGFRIFNONA 26 (L) 01/14/2019    BCR 32.9 (H) 01/14/2019    CO2 16.0 (L) 01/14/2019    CALCIUM 7.6 (L) 01/14/2019    ALBUMIN 3.26 01/12/2019    LABIL2 1.0 10/08/2014    AST 13 01/12/2019    ALT 10 01/12/2019       Microbiology: cultures negative so far      RADIOLOGY:  Imaging Results (last 72 hours)     Procedure Component Value Units Date/Time    CT Lower Extremity Left Without Contrast [[id number]] Collected:  01/13/19 0149     Updated:  01/13/19 0256    Narrative:       EXAM:    CT Left Lower Extremity Without IV Contrast, Foot     EXAM DATE/TIME:    1/13/2019 1:49 AM     CLINICAL HISTORY:    83 years old, male; Sepsis, unspecified organism; Pleural effusion, not   elsewhere classified; Constipation, unspecified; Pressure ulcer of left heel,   stage 3; Urinary tract infection, site not specified; Other ascites; Fever,   unspecified; Pain; Foot; Additional info: Left foot wound, R/O osteo     TECHNIQUE:    CT of the Left lower extremity without intravenous contrast was performed.   Exam focused on the foot.    All CT scans at  this facility use at least one of these dose optimization   techniques: automated exposure control; mA and/or kV adjustment per patient   size (includes targeted exams where dose is matched to clinical indication); or   iterative reconstruction.    Coronal and sagittal reformatted images were created and reviewed.     COMPARISON:    No relevant prior studies available.     FINDINGS:    Bones/joints:  Inferior and superior calcaneal osteophytes. DJD of ankle   joint, subtalar joint, multiple mid-tarsal joints, multiple MTP joints, several   IP joints of toes. Some generalized osseous demineralization. Some scattered   degenerative subarticular cysts of ankle and multiple bones of the foot. No   obvious large osseous erosions, but subtle acute demineralization abnormality   could be obscured. If further study is desired, then consider MRI with/without   IV contrast. No dislocation or obvious acute fracture. Degenerative changes and   some scattered demineralization of right ankle and foot, as visualized. Hallux   valgus- metatarsus varus deformity.    Soft tissues:  Skin ulcer with skin irregularity, granular calcifications, and   thickening along posterior heel. Edema/swelling of left ankle and foot. Cannot   exclude cellulitis at heel.  Correlation with CT contrast study or ultrasound   could help rule out any small fluid pocket within the swollen soft tissues of   the heel-distal Achilles region, if desired..  Achilles tendon appears grossly   continuous, but MR correlation would be more precise.    Vasculature:  Atherosclerotic placques scattered along some vessels.       Impression:       1. Skin ulcer with skin irregularity and thickening along posterior heel.    Cannot exclude cellulitis. See above.  2. Soft tissue swelling of ankle and foot.   3. Inferior and superior calcaneal osteophytes.   4. DJD of ankle joint, subtalar joint, multiple mid-tarsal joints, multiple MTP   joints, several IP joints of toes.    5. Some generalized osseous demineralization.  Also, see above.  Sheath   6. No dislocation or obvious acute fracture.   7. Hallux valgus- metatarsus varus deformity.   8. Scattered atherosclerosis.  9.  Additional findings, as above.     THIS DOCUMENT HAS BEEN ELECTRONICALLY SIGNED BY HOLDEN BACON MD    CT Abdomen Pelvis Without Contrast [[id number]] Collected:  01/12/19 1945     Updated:  01/12/19 2153    Addenda:        Note: Critical Findings were discussed with Jacky Roy at 1/12/2019   9:49   PM EST. The report was understood and acknowledged by the healthcare   giver, who   stated patient has elevated WBCs, no history of recent fall or   anticoagulants.    THIS DOCUMENT HAS BEEN ELECTRONICALLY SIGNED BY HOLDEN BACON MD  Signed:  01/12/19 2153 by Holden Bacon MD    Narrative:       EXAM:    CT Abdomen and Pelvis Without Contrast     EXAM DATE/TIME:    1/12/2019 7:45 PM     CLINICAL HISTORY:    83 years old, male; Pain; Abdominal pain; Generalized; Prior surgery;   Additional info: Abdominal distention with vomiting and fever     TECHNIQUE:    Axial computed tomography images of the abdomen and pelvis without contrast.    All CT scans at this facility use at least one of these dose optimization   techniques: automated exposure control; mA and/or kV adjustment per patient   size (includes targeted exams where dose is matched to clinical indication); or   iterative reconstruction.    Coronal reformatted images were created and reviewed.     COMPARISON:    No relevant prior studies available.     FINDINGS:    Lower thorax:  Old granulomatous disease of lung(s). There are coronary artery   atheromatous calcifications, consistent with CAD. Dystrophic calcifications on   the mitral anulus. Normal heart size. Moderate right pleural effusion. Possible   tiny noncalcified 2 mm nodules versus vessels at lateral right lower lobe   base.( For patients at low risk (minimal or absent history of  smoking and of   other known risk factors), no routine follow-up is indicated. For patients at   high risk (history of smoking or of other known risk factors), consider   optional CT at 12 months. (Magy et al., Fleischner Society, 2017).).    Scattered bibasilar subsegmental atelectasis. Mild RLL compressive atelectasis   and/or infiltrate. Some punctate high density possible debris or additional   calcifications in base of right lower lobe. Small hiatal hernia.     ABDOMEN:    Liver:  Evidence of old granulomatous disease of the liver. No hepatomegaly.    Gallbladder and bile ducts: Unremarkable. No calcified stones. No ductal   dilation.    Pancreas:  Pancreatic atrophy.    Spleen: Vascular calcifications. No splenomegaly.    Adrenals:  Borderline in fullness of bilateral adrenals.    Kidneys and ureters:  Mild bilateral perinephric cobwebs/edema. Mild left   renal cortical scarring. No hydronephrosis or nephrolithiasis. No obstructive   uropathy.    Stomach and bowel:  Moderate fecal ball in distended rectum with mild wall   thickening bordered by mild edema, suspect mild stercoral colitis from mild   constipation. There is at least a moderate amount of colonic feces. Gas   scattered in nondilated small bowel. Nonspecific bowel gas. Moderate distention   of stomach containing air and fluid.    Appendix:  Appendix obscured.    Retroperitoneal space:  Oval 3.9 cm x 6.7 cm x 9.1 cm soft tissue density in   right retroperitoneum posterior to the right kidney-parapsoas region bordered   by hazy  density/edema could represent fluid collection such as hematoma or   abscess. No bubbles of air within this abnormal area. Possible slight swelling   of right psoas muscle. Ultrasound may be helpful to confirm suspected internal   fluid. Clinically correlate. Followup recommended.     PELVIS:    Bladder:  Subtotally contracted bladder restricts wall evaluation. No calculi.    Reproductive:  Enlarged prostate with  transverse diameter of 5.7 cm.    Subperitoneal space:  Mild perirectal edema.     ABDOMEN and PELVIS:    Intraperitoneal space:  No significant ascites.    Bones/joints:  Mild scoliosis. Degenerative changes of the spine. DJD of   lateral hips. Mild DJD of bilateral sacroiliac joints. Chronic fracture L2   vertebral body.    Soft tissues:  Mild anasarca. Small 1.1 cm patchy or nodular density in   subcutaneous fat flank RLQ. Scattered areas of mild skin thickening along the   anterior pelvis. Mild laxity of right flank abdominal wall. Mild anasarca.   Slightly more prominent edema or contusion in right flank subcutaneous fat.    Vasculature:  Atherosclerotic placques scattered along some vessels. No aortic   aneurysm. Lower abdominal aortic stent present.    Lymph nodes:  Calcified granulomata within mediastinal lymph node(s) from old   granulomatous disease.  No enlarged pelvic or abdominal lymph nodes.      Impression:       1. CAD.   2. Moderate right pleural effusion.   3. Bibasilar subsegmental atelectasis. Mild RLL compressive atelectasis and/or   infiltrate.   4. Constipation with mild rectal stercoral colitis.   5. Prostate enlargement.   6. Small hiatal hernia.   7. Pancreatic atrophy.   8. Nonspecific bowel gas pattern.   9. Right retroperitoneal 3.9 cm x 6.7 cm x 9.1 cm oval abnormality, possible   fluid collection such as hematoma or abscess. See above.   10. Mild anasarca. Slightly more prominent edema or contusion in right flank   subcutaneous fat.   11.  Suspect mild swelling of the right psoas muscle.  12.  Additional findings, as above.     THIS DOCUMENT HAS BEEN ELECTRONICALLY SIGNED BY HOLDEN BACON MD          Assessment and Plan:     1.  Severe sepsis- with leukocytosis, elevated PCT, lactic acidosis, acute kidney injury, fever and a known focus of infection.  I suspect that his sepsis is secondary to a urinary tract infection.  2.  Pyuria/UTI-he has yeast on urinalysis and his UTI could be  secondary to Candida.  3.  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  We can certainly not exclude a retroperitoneal abscess and if he fails to improve, we will need to consider aspiration of this area.  4.  Left heel decubitus ulcer - for mri  5.  Right knee septic arthritis, s/p I & D  6.  Malignant melanoma right arm  7.  Diabetes Mellitus, type 2  8.  Acute/chronic kidney disease, stage III  9.  Severe Debility      PLAN/RECOMMENDATIONS:     Now off  Vancomycin Zosyn   Agree with   MRI left heel  rule out osteomyelitis  Follow  Blood cultures and urine cultures  will.  Obtain cultures and records from [facility]   continue  Cefepime IV  Continue   Fluconazole IV    Long discussion with family             Erasmo Cabral MD  1/15/2019

## 2019-01-15 NOTE — THERAPY TREATMENT NOTE
"Acute Care - Occupational Therapy Treatment Note  [facility]     Patient Name: Leo Elias  : 1935  MRN: [id number]  Today's Date: 1/15/2019  Onset of Illness/Injury or Date of Surgery: 19  Date of Referral to OT: 19  Referring Physician: HOLLY Zhang    Admit Date: 2019       ICD-10-CM ICD-9-CM   1. Sepsis due to urinary tract infection (CMS/HCC) A41.9 038.9    N39.0 995.91     599.0   2. Retroperitoneal fluid collection R18.8 789.59   3. Pleural effusion, right J90 511.9   4. Pressure injury of left heel, stage 3 (CMS/HCC) L89.623 707.07     707.23   5. Constipation, unspecified constipation type K59.00 564.00   6. Acute febrile illness R50.9 780.60   7. Impaired mobility and ADLs Z74.09 799.89   8. Impaired functional mobility, balance, gait, and endurance Z74.09 V49.89     Patient Active Problem List   Diagnosis   • Sepsis, unspecified organism (CMS/HCC)   • Hypocalcemia   • Acute renal failure superimposed on stage 3 chronic kidney disease (CMS/HCC)   • Sepsis due to urinary tract infection (CMS/HCC)   • Type 2 diabetes mellitus (CMS/HCC)   • Hypothyroidism (acquired)   • Essential hypertension   • Wound healing, delayed, left foot   • Acute UTI (urinary tract infection)     Past Medical History:   Diagnosis Date   • Diabetes mellitus (CMS/HCC)    • Disease of thyroid gland    • Hypertension    • Renal disorder      Past Surgical History:   Procedure Laterality Date   • KNEE SURGERY      \"septic knee\"       Therapy Treatment    Rehabilitation Treatment Summary     Row Name 01/15/19 0950             Treatment Time/Intention    Discipline  occupational therapist  -AC      Document Type  therapy note (daily note)  -AC      Subjective Information  no complaints  -AC      Patient/Family Observations  Wife present and encourging pt to participate.   -AC      Patient Effort  fair  -AC      Comment  pt declined EOB; agreeable to roll R/L and ther ex  -AC      Existing " Precautions/Restrictions  fall L heel wound  -AC      Treatment Considerations/Comments  bedbound since June 2018  -AC      Recorded by [AC] Shaina Balbuena, OT 01/15/19 1106      Row Name 01/15/19 0950             Cognitive Assessment/Intervention- PT/OT    Orientation Status (Cognition)  oriented to;person;place  -AC      Follows Commands (Cognition)  follows one step commands;50-74% accuracy  -AC      Recorded by [AC] Shaina Balbuena, OT 01/15/19 1106      Row Name 01/15/19 0950             Safety Issues, Functional Mobility    Impairments Affecting Function (Mobility)  endurance/activity tolerance  -AC      Recorded by [AC] Shaina Balbuena, OT 01/15/19 1106      Row Name 01/15/19 0950             Bed Mobility Assessment/Treatment    Bed Mobility Assessment/Treatment  rolling left;rolling right rolled R and L x 2 to clean BM and change pads  -AC      Rolling Left [place] (Bed Mobility)  minimum assist (75% patient effort)  -AC      Rolling Right [place] (Bed Mobility)  minimum assist (75% patient effort)  -AC      Assistive Device (Bed Mobility)  bed rails  -AC      Comment (Bed Mobility)  pt declined to sit EOB  -AC      Recorded by [AC] Shaina Balbuena, OT 01/15/19 1106      Row Name 01/15/19 0950             Functional Mobility    Functional Mobility- Ind. Level  unable to perform bedbound  -AC      Recorded by [AC] Shaina Balbuena, OT 01/15/19 1106      Row Name 01/15/19 0950             ADL Assessment/Intervention    93633 - OT Self Care/Mgmt Minutes  -- pt declined to wash face, brush teeth  -AC      Recorded by [AC] Shaina Balbuena, OT 01/15/19 1106      Row Name 01/15/19 0950             BADL Safety/Performance    Impairments, BADL Safety/Performance  endurance/activity tolerance;strength  -AC      Recorded by [AC] Shaina Balbuena, OT 01/15/19 1106      Row Name 01/15/19 0950             Motor Skills Assessment/Interventions    Additional Documentation  Therapeutic Exercise (Group);Therapeutic Exercise  Interventions (Group)  -AC      Recorded by [AC] Shaina Balbuena, OT 01/15/19 1106      Row Name 01/15/19 0950             Therapeutic Exercise    25499 - OT Therapeutic Exercise Minutes  5  -AC      09828 - OT Therapeutic Activity Minutes  10  -AC      Recorded by [AC] Shaina Balbuena, OT 01/15/19 1106      Row Name 01/15/19 0950             Therapeutic Exercise    Upper Extremity Range of Motion (Therapeutic Exercise)  shoulder flexion/extension, bilateral;elbow flexion/extension, bilateral;shoulder horizontal abduction/adduction, bilateral  -AC      Lower Extremity (Therapeutic Exercise)  SLR (straight leg raise), bilateral  -AC      Exercise Type (Therapeutic Exercise)  AAROM (active assistive range of motion)  -AC      Position (Therapeutic Exercise)  supine  -AC      Sets/Reps (Therapeutic Exercise)  1/10  -AC      Recorded by [AC] Shaina Balbuena, OT 01/15/19 1106      Row Name 01/15/19 0950             Positioning and Restraints    Pre-Treatment Position  in bed  -AC      Post Treatment Position  bed  -AC      In Bed  supine;call light within reach;encouraged to call for assist;exit alarm on;with family/caregiver  -AC      Recorded by [AC] Shaina Balbuena, OT 01/15/19 1106      Row Name 01/15/19 0950             Pain Scale: Numbers Pre/Post-Treatment    Pain Scale: Numbers, Pretreatment  0/10 - no pain  -AC      Pain Scale: Numbers, Post-Treatment  0/10 - no pain  -AC      Recorded by [AC] Shaina Balbuena, OT 01/15/19 1106      Row Name                Wound 01/13/19 0200 medial coccyx pressure injury    Wound - Properties Group Date first assessed: 01/13/19 [KB] Time first assessed: 0200 [KB] Orientation: medial [KB] Location: coccyx [KB] Type: pressure injury [KB] Stage, Pressure Injury: Stage 2 [KB] Recorded by:  [KB] Halley Krueger RN 01/13/19 0240    Row Name                Wound 01/13/19 0200 Left heel    Wound - Properties Group Date first assessed: 01/13/19 [KB] Time first assessed: 0200 [KB] Side:  Left [KB] Location: heel [KB] Stage, Pressure Injury: unstageable [KB] Additional Comments: possible stage 4- will reassess once slough has been removed further [HW] Recorded by:  [HW] Corrine Alonzo, HOLLY 01/14/19 1535 [KB] Halley Krueger RN 01/13/19 0241    Row Name 01/15/19 0991             Outcome Summary/Treatment Plan (OT)    Daily Summary of Progress (OT)  progress toward functional goals is gradual  -AC      Anticipated Discharge Disposition (OT)  home with assist;home with home health  -AC      Recorded by [AC] Shaina Balbuena, OT 01/15/19 1106        User Key  (r) = Recorded By, (t) = Taken By, (c) = Cosigned By    Initials Name Effective Dates Discipline    AC Shaina Balbuena, OT 06/23/15 -  OT     Corrine Alonzo, HOLLY 05/14/18 -  Physician    Halley Calderón RN 06/16/16 -  Nurse        Wound 01/13/19 0200 medial coccyx pressure injury (Active)   Dressing Appearance open to air 1/15/2019  8:00 AM   Base pink 1/15/2019  8:00 AM   Periwound non-blanchable 1/15/2019  8:00 AM   Periwound Temperature warm 1/15/2019  8:00 AM   Periwound Skin Turgor soft 1/15/2019  8:00 AM   Drainage Amount none 1/15/2019  8:00 AM       Wound 01/13/19 0200 Left heel (Active)   Dressing Appearance moist drainage 1/15/2019  8:00 AM   Closure LYDIA 1/15/2019  8:00 AM   Base dressing in place, unable to visualize 1/15/2019  8:00 AM   Periwound pink;redness 1/15/2019  8:00 AM   Periwound Temperature cool 1/15/2019  8:00 AM   Periwound Skin Turgor soft 1/15/2019  8:00 AM   Edges open 1/15/2019  8:00 AM   Drainage Characteristics/Odor serosanguineous 1/15/2019  8:00 AM   Drainage Amount small 1/15/2019  8:00 AM   Dressing Care, Wound foam 1/15/2019  8:00 AM       Occupational Therapy Education     Title: PT OT SLP Therapies (In Progress)     Topic: Occupational Therapy (In Progress)     Point: ADL training (Done)     Description: Instruct learner(s) on proper safety adaptation and remediation techniques during self care  or transfers.   Instruct in proper use of assistive devices.    Learning Progress Summary           Patient Acceptance, E, VU,NR by AC at 1/15/2019 11:06 AM    Comment:  benefits of activity   Family Acceptance, E, VU,NR by AC at 1/15/2019 11:06 AM    Comment:  benefits of activity                   Point: Precautions (Done)     Description: Instruct learner(s) on prescribed precautions during self-care and functional transfers.    Learning Progress Summary           Patient Acceptance, E, VU by TA at 1/13/2019  1:52 PM    Comment:  Body mechnaics with bed mobility; reinforced need for call for assist with OOB activities.                   Point: Body mechanics (Done)     Description: Instruct learner(s) on proper positioning and spine alignment during self-care, functional mobility activities and/or exercises.    Learning Progress Summary           Patient Acceptance, E, VU by TA at 1/13/2019  1:52 PM    Comment:  Body mechnaics with bed mobility; reinforced need for call for assist with OOB activities.                               User Key     Initials Effective Dates Name Provider Type Discipline     06/23/15 -  Shaina Balbuena, OT Occupational Therapist OT    BILL 03/14/16 -  Ronnie Kent, OT Occupational Therapist OT                OT Recommendation and Plan  Outcome Summary/Treatment Plan (OT)  Daily Summary of Progress (OT): progress toward functional goals is gradual  Anticipated Discharge Disposition (OT): home with assist, home with home health  Daily Summary of Progress (OT): progress toward functional goals is gradual  Plan of Care Review  Plan of Care Reviewed With: patient, spouse  Plan of Care Reviewed With: patient, spouse  Outcome Summary: Pt required encouragement to participate.  Pt declined EOB and grooming activities.  Pt min A to roll R/L 2 times.  Pt required active assist with BUE ther ex.   Outcome Measures     Row Name 01/15/19 0950 01/13/19 1315 01/13/19 1010       How much help  from another person do you currently need...    Turning from your back to your side while in flat bed without using bedrails?  --  3  -LM  --    Moving from lying on back to sitting on the side of a flat bed without bedrails?  --  2  -LM  --    Moving to and from a bed to a chair (including a wheelchair)?  --  1  -LM  --    Standing up from a chair using your arms (e.g., wheelchair, bedside chair)?  --  1  -LM  --    Climbing 3-5 steps with a railing?  --  1  -LM  --    To walk in hospital room?  --  1  -LM  --    AM-PAC 6 Clicks Score  --  9  -LM  --       How much help from another is currently needed...    Putting on and taking off regular lower body clothing?  1  -AC  --  1  -TA    Bathing (including washing, rinsing, and drying)  2  -AC  --  2  -TA    Toileting (which includes using toilet bed pan or urinal)  1  -AC  --  2  -TA    Putting on and taking off regular upper body clothing  2  -AC  --  2  -TA    Taking care of personal grooming (such as brushing teeth)  2  -AC  --  2  -TA    Eating meals  3  -AC  --  3  -TA    Score  11  -AC  --  12  -TA       Functional Assessment    Outcome Measure Options  AM-PAC 6 Clicks Daily Activity (OT)  -AC  AM-PAC 6 Clicks Basic Mobility (PT)  -  AM-PAC 6 Clicks Daily Activity (OT)  -TA      User Key  (r) = Recorded By, (t) = Taken By, (c) = Cosigned By    Initials Name Provider Type    AC Shaina Balbuena, OT Occupational Therapist    LM Kylie Preciado, PT Physical Therapist    TA Ronnie Kent, OT Occupational Therapist           Time Calculation:   Time Calculation- OT     Row Name 01/15/19 0950             Time Calculation- OT    OT Start Time  0950  -      Total Timed Code Minutes- OT  15 minute(s)  -AC      OT Received On  01/15/19  -      OT Goal Re-Cert Due Date  01/23/19  -         Timed Charges    67647 - OT Therapeutic Exercise Minutes  5  -      30912 - OT Therapeutic Activity Minutes  10  -      26469 - OT Self Care/Mgmt Minutes  -- pt declined  to wash face, brush teeth  -        User Key  (r) = Recorded By, (t) = Taken By, (c) = Cosigned By    Initials Name Provider Type    AC Shaina Balbuena, OT Occupational Therapist           Therapy Suggested Charges     Code   Minutes Charges    18361 (CPT®) Hc Ot Neuromusc Re Education Ea 15 Min      68140 (CPT®) Hc Ot Ther Proc Ea 15 Min 5     79981 (CPT®) Hc Ot Therapeutic Act Ea 15 Min 10 1    79955 (CPT®) Hc Ot Manual Therapy Ea 15 Min      53361 (CPT®) Hc Ot Iontophoresis Ea 15 Min      45723 (CPT®) Hc Ot Elec Stim Ea-Per 15 Min      99564 (CPT®) Hc Ot Ultrasound Ea 15 Min      73513 (CPT®) Hc Ot Self Care/Mgmt/Train Ea 15 Min      Total  15 1        Therapy Charges for Today     Code Description Service Date Service Provider Modifiers Qty    60295626973 HC OT THERAPEUTIC ACT EA 15 MIN 1/15/2019 Shaina Balbuena, OT GO 1    50507720507 HC OT THER SUPP EA 15 MIN 1/15/2019 Shaina Balbuena, OT GO 1               Shaina Balbuena OT  1/15/2019

## 2019-01-16 LAB
ANION GAP SERPL CALCULATED.3IONS-SCNC: 7 MMOL/L (ref 3–11)
BUN BLD-MCNC: 68 MG/DL (ref 9–23)
BUN/CREAT SERPL: 32.5 (ref 7–25)
CALCIUM SPEC-SCNC: 7 MG/DL (ref 8.7–10.4)
CHLORIDE SERPL-SCNC: 111 MMOL/L (ref 99–109)
CO2 SERPL-SCNC: 17 MMOL/L (ref 20–31)
CREAT BLD-MCNC: 2.09 MG/DL (ref 0.6–1.3)
GFR SERPL CREATININE-BSD FRML MDRD: 31 ML/MIN/1.73
GLUCOSE BLD-MCNC: 114 MG/DL (ref 70–100)
GLUCOSE BLDC GLUCOMTR-MCNC: 126 MG/DL (ref 70–130)
GLUCOSE BLDC GLUCOMTR-MCNC: 164 MG/DL (ref 70–130)
GLUCOSE BLDC GLUCOMTR-MCNC: 168 MG/DL (ref 70–130)
GLUCOSE BLDC GLUCOMTR-MCNC: 201 MG/DL (ref 70–130)
INTERPRETATION UR IFE-IMP: NORMAL
MAGNESIUM SERPL-MCNC: 1.6 MG/DL (ref 1.3–2.7)
POTASSIUM BLD-SCNC: 3.5 MMOL/L (ref 3.5–5.5)
POTASSIUM BLD-SCNC: 3.7 MMOL/L (ref 3.5–5.5)
SODIUM BLD-SCNC: 135 MMOL/L (ref 132–146)

## 2019-01-16 PROCEDURE — 82962 GLUCOSE BLOOD TEST: CPT

## 2019-01-16 PROCEDURE — 25010000002 HEPARIN (PORCINE) PER 1000 UNITS: Performed by: NURSE PRACTITIONER

## 2019-01-16 PROCEDURE — 25010000002 FLUCONAZOLE PER 200 MG: Performed by: INTERNAL MEDICINE

## 2019-01-16 PROCEDURE — 99232 SBSQ HOSP IP/OBS MODERATE 35: CPT | Performed by: INTERNAL MEDICINE

## 2019-01-16 PROCEDURE — 84132 ASSAY OF SERUM POTASSIUM: CPT | Performed by: INTERNAL MEDICINE

## 2019-01-16 PROCEDURE — 25010000002 CEFEPIME 2 G/NS 100 ML SOLUTION: Performed by: INTERNAL MEDICINE

## 2019-01-16 PROCEDURE — 83735 ASSAY OF MAGNESIUM: CPT

## 2019-01-16 PROCEDURE — 93005 ELECTROCARDIOGRAM TRACING: CPT | Performed by: INTERNAL MEDICINE

## 2019-01-16 PROCEDURE — 80048 BASIC METABOLIC PNL TOTAL CA: CPT

## 2019-01-16 PROCEDURE — 97530 THERAPEUTIC ACTIVITIES: CPT | Performed by: PHYSICAL THERAPIST

## 2019-01-16 PROCEDURE — 93010 ELECTROCARDIOGRAM REPORT: CPT | Performed by: INTERNAL MEDICINE

## 2019-01-16 PROCEDURE — 97110 THERAPEUTIC EXERCISES: CPT | Performed by: PHYSICAL THERAPIST

## 2019-01-16 RX ORDER — MAGNESIUM SULFATE HEPTAHYDRATE 40 MG/ML
2 INJECTION, SOLUTION INTRAVENOUS AS NEEDED
Status: DISCONTINUED | OUTPATIENT
Start: 2019-01-16 | End: 2019-01-17 | Stop reason: HOSPADM

## 2019-01-16 RX ORDER — POTASSIUM CHLORIDE 7.45 MG/ML
10 INJECTION INTRAVENOUS
Status: DISCONTINUED | OUTPATIENT
Start: 2019-01-16 | End: 2019-01-17 | Stop reason: HOSPADM

## 2019-01-16 RX ORDER — FLUCONAZOLE 200 MG/1
200 TABLET ORAL EVERY 24 HOURS
Status: DISCONTINUED | OUTPATIENT
Start: 2019-01-17 | End: 2019-01-17 | Stop reason: HOSPADM

## 2019-01-16 RX ORDER — LEVOFLOXACIN 250 MG/1
250 TABLET ORAL EVERY 24 HOURS
Status: DISCONTINUED | OUTPATIENT
Start: 2019-01-17 | End: 2019-01-17 | Stop reason: HOSPADM

## 2019-01-16 RX ORDER — POTASSIUM CHLORIDE 1.5 G/1.77G
40 POWDER, FOR SOLUTION ORAL AS NEEDED
Status: DISCONTINUED | OUTPATIENT
Start: 2019-01-16 | End: 2019-01-17 | Stop reason: HOSPADM

## 2019-01-16 RX ORDER — MAGNESIUM SULFATE HEPTAHYDRATE 40 MG/ML
4 INJECTION, SOLUTION INTRAVENOUS AS NEEDED
Status: DISCONTINUED | OUTPATIENT
Start: 2019-01-16 | End: 2019-01-17 | Stop reason: HOSPADM

## 2019-01-16 RX ORDER — POTASSIUM CHLORIDE 750 MG/1
40 CAPSULE, EXTENDED RELEASE ORAL AS NEEDED
Status: DISCONTINUED | OUTPATIENT
Start: 2019-01-16 | End: 2019-01-17 | Stop reason: HOSPADM

## 2019-01-16 RX ADMIN — HEPARIN SODIUM 5000 UNITS: 5000 INJECTION INTRAVENOUS; SUBCUTANEOUS at 20:02

## 2019-01-16 RX ADMIN — MAGNESIUM SULFATE HEPTAHYDRATE 4 G: 40 INJECTION, SOLUTION INTRAVENOUS at 12:05

## 2019-01-16 RX ADMIN — HYDROXYZINE HYDROCHLORIDE 25 MG: 25 TABLET, FILM COATED ORAL at 20:02

## 2019-01-16 RX ADMIN — Medication 5 MG: at 20:02

## 2019-01-16 RX ADMIN — CEFEPIME 2 G: 2 INJECTION, POWDER, FOR SOLUTION INTRAVENOUS at 10:23

## 2019-01-16 RX ADMIN — INSULIN LISPRO 3 UNITS: 100 INJECTION, SOLUTION INTRAVENOUS; SUBCUTANEOUS at 20:12

## 2019-01-16 RX ADMIN — POTASSIUM CHLORIDE 40 MEQ: 750 CAPSULE, EXTENDED RELEASE ORAL at 16:29

## 2019-01-16 RX ADMIN — POTASSIUM CHLORIDE 40 MEQ: 750 CAPSULE, EXTENDED RELEASE ORAL at 12:05

## 2019-01-16 RX ADMIN — CASTOR OIL AND BALSAM, PERU: 788; 87 OINTMENT TOPICAL at 20:02

## 2019-01-16 RX ADMIN — Medication 1 CAPSULE: at 08:26

## 2019-01-16 RX ADMIN — LEVOTHYROXINE SODIUM 50 MCG: 50 TABLET ORAL at 04:38

## 2019-01-16 RX ADMIN — FLUCONAZOLE, SODIUM CHLORIDE 200 MG: 2 INJECTION INTRAVENOUS at 08:26

## 2019-01-16 RX ADMIN — HYDROXYZINE HYDROCHLORIDE 25 MG: 25 TABLET, FILM COATED ORAL at 08:26

## 2019-01-16 RX ADMIN — SODIUM CHLORIDE 100 ML/HR: 9 INJECTION, SOLUTION INTRAVENOUS at 04:38

## 2019-01-16 RX ADMIN — SODIUM CHLORIDE 100 ML/HR: 9 INJECTION, SOLUTION INTRAVENOUS at 17:57

## 2019-01-16 RX ADMIN — SODIUM CHLORIDE, PRESERVATIVE FREE 3 ML: 5 INJECTION INTRAVENOUS at 20:02

## 2019-01-16 RX ADMIN — HEPARIN SODIUM 5000 UNITS: 5000 INJECTION INTRAVENOUS; SUBCUTANEOUS at 08:26

## 2019-01-16 RX ADMIN — CASTOR OIL AND BALSAM, PERU: 788; 87 OINTMENT TOPICAL at 08:26

## 2019-01-16 RX ADMIN — FINASTERIDE 5 MG: 5 TABLET, FILM COATED ORAL at 08:26

## 2019-01-16 RX ADMIN — TAMSULOSIN HYDROCHLORIDE 0.8 MG: 0.4 CAPSULE ORAL at 20:02

## 2019-01-16 RX ADMIN — INSULIN LISPRO 2 UNITS: 100 INJECTION, SOLUTION INTRAVENOUS; SUBCUTANEOUS at 16:30

## 2019-01-16 RX ADMIN — INSULIN LISPRO 2 UNITS: 100 INJECTION, SOLUTION INTRAVENOUS; SUBCUTANEOUS at 12:05

## 2019-01-16 NOTE — PLAN OF CARE
Problem: Patient Care Overview  Goal: Plan of Care Review  Outcome: Ongoing (interventions implemented as appropriate)   01/16/19 0131   Coping/Psychosocial   Plan of Care Reviewed With patient   OTHER   Outcome Summary VSS, pt confused during the night, c/o mccullough being placed, redirected patient and he is calm at this time, resting well. No other concerns noted.    Plan of Care Review   Progress improving

## 2019-01-16 NOTE — PROGRESS NOTES
"Leo Elias  1935  [id number]  1/16/2019    CC:   Chief Complaint   Patient presents with   • Fever       Leo Elias is a 83 y.o. male here for cc fever    History of present illness:    Patient is a 83 y.o. male seen today for sepsis.  He is a previous patient of Dr. Erasmo Cabral with multiple underlying medical problems.  He had been complaining of burning with urination and abdominal pain for approximately one week, and began vomiting Saturday, was febrile,  and was brought to the ED.  His wife states that he was given Flagyl for his left heel decubitus ulcer on Thursday.   He has a history of right knee septic arthritis, and was treated for a \"long time.   He underwent a repeat I & D in June at , due to persistent pain,  and then went to rehab where he developed the ulcer treated through home health.  He has also been diagnosed with malignant melanoma on his right arm at .   While in rehab,  and had fallen on the floor, taken to   where they noted a right retroperitoneal hematoma, and severe anemia, for which he was transfused   When he became ill on Saturday, his wife wanted him to be brought here and \"figure it all out\"   An abdominal Ct revealed a right retroperitoneal 3.9 x 9.1 cm oval abnormality, possible fluid collection such as hematoma or abscess.  CT of left lower extremity skin ulcer with skin irregularity and thickening along posterior heel   Admitting labs with a leukocytosis of 18, 000, elevated PCT, lactic acidosis, acute kidney injury, temperature of 103 degrees and a urinalysis with too numerous to count white cells.  Blood and urine cultures have been negative to date.  He is currently on Vancomycin and Zosyn and we were consulted for evaluation and treatment.      1/15/19 - No Hx available   ROS discussed with staff and family.  Had fever and heel ulcer.  For MRI  1/16 - No hx available  ROS discussed with staff and family         Past medical history:  Past Medical History:   Diagnosis " Date   • Diabetes mellitus (CMS/HCC)    • Disease of thyroid gland    • Hypertension    • Renal disorder        Medications:   Current Facility-Administered Medications:   •  castor oil-balsam peru (VENELEX) ointment, , Topical, Q12H, Thee Hernandez MD  •  castor oil-balsam peru (VENELEX) ointment, , Topical, PRN, Thee Hernandez MD  •  cefepime (MAXIPIME) 2 g/100 mL 0.9% NS (mbp), 2 g, Intravenous, Q24H, Jericho Rogel, RPH, 2 g at 01/15/19 0819  •  dextrose (D50W) 25 g/ 50mL Intravenous Solution 25 g, 25 g, Intravenous, Q15 Min PRN, Criss Miller APRN  •  dextrose (GLUTOSE) oral gel 15 g, 15 g, Oral, Q15 Min PRN, Criss Miller APRN  •  finasteride (PROSCAR) tablet 5 mg, 5 mg, Oral, Daily, Trinidad Serrano II, DO, 5 mg at 01/16/19 0826  •  fluconazole (DIFLUCAN) IVPB 200 mg, 200 mg, Intravenous, Daily, Giovanni Wolf MD, 200 mg at 01/16/19 0826  •  glucagon (human recombinant) (GLUCAGEN DIAGNOSTIC) injection 1 mg, 1 mg, Subcutaneous, PRN, Criss Miller APRN  •  heparin (porcine) 5000 UNIT/ML injection 5,000 Units, 5,000 Units, Subcutaneous, Q12H, Criss Miller APRN, 5,000 Units at 01/16/19 0826  •  hydrOXYzine (ATARAX) tablet 25 mg, 25 mg, Oral, BID, Criss Miller APRN, 25 mg at 01/16/19 0826  •  insulin lispro (humaLOG) injection 0-7 Units, 0-7 Units, Subcutaneous, 4x Daily With Meals & Nightly, Criss Miller APRN, 3 Units at 01/15/19 2048  •  lactobacillus acidophilus (RISAQUAD) capsule 1 capsule, 1 capsule, Oral, Daily, Criss Miller APRN, 1 capsule at 01/16/19 0826  •  levoFLOXacin (LEVAQUIN) tablet 250 mg, 250 mg, Oral, Q24H, Erasmo Cabral MD  •  levothyroxine (SYNTHROID, LEVOTHROID) tablet 50 mcg, 50 mcg, Oral, Daily, Criss Miller APRN, 50 mcg at 01/16/19 0438  •  melatonin sublingual tablet 5 mg, 5 mg, Sublingual, Nightly PRN, Criss Miller APRN, 5 mg at 01/13/19 0137  •  ondansetron (ZOFRAN) injection 4 mg, 4 mg, Intravenous, Q6H  PRN, Criss Miller APRN, 4 mg at 01/13/19 0539  •  Pharmacy Consult - Pharmacy to dose, , Does not apply, Continuous PRN, Tawny Gleason APRN  •  sodium chloride 0.9 % flush 10 mL, 10 mL, Intravenous, PRN, Jacky Dubois MD  •  sodium chloride 0.9 % flush 3 mL, 3 mL, Intravenous, Q12H, Criss Miller APRN, 3 mL at 01/15/19 2043  •  sodium chloride 0.9 % flush 3-10 mL, 3-10 mL, Intravenous, PRN, Criss Miller APRN  •  Sodium chloride 0.9 % infusion, 100 mL/hr, Intravenous, Continuous, Criss Miller APRN, Last Rate: 100 mL/hr at 01/16/19 0438, 100 mL/hr at 01/16/19 0438  •  tamsulosin (FLOMAX) 24 hr capsule 0.8 mg, 0.8 mg, Oral, Nightly, Thee Hernandez MD, 0.8 mg at 01/15/19 2042  Antibiotics:  Anti-Infectives (From admission, onward)    Ordered     Dose/Rate Route Frequency Start Stop    01/16/19 0847  levoFLOXacin (LEVAQUIN) tablet 250 mg     Ordering Provider:  Erasmo Cabral MD    250 mg Oral Every 24 Hours 01/16/19 0945 01/23/19 0944    01/14/19 0908  cefepime (MAXIPIME) 2 g/100 mL 0.9% NS (mbp)     Ordering Provider:  Jericho Rogel RPH    2 g  over 4 Hours Intravenous Every 24 Hours 01/15/19 0900 01/22/19 0959    01/14/19 0907  fluconazole (DIFLUCAN) IVPB 200 mg     Ordering Provider:  Giovanni Wolf MD    200 mg  over 60 Minutes Intravenous Daily 01/14/19 1000 01/21/19 0859    01/14/19 0908  cefepime (MAXIPIME) 2 g/100 mL 0.9% NS (mbp)     Ordering Provider:  Jericho Rogel RPH    2 g  200 mL/hr over 30 Minutes Intravenous Once 01/14/19 1000 01/14/19 0956    01/12/19 1842  piperacillin-tazobactam (ZOSYN) 3.375 g in iso-osmotic dextrose 50 ml (premix)     Ordering Provider:  Jacky Dubois MD    3.375 g  over 30 Minutes Intravenous Once 01/12/19 1855 01/12/19 2033 01/12/19 1842  vancomycin 2250 mg/500 mL 0.9% NS IVPB ([facility])     Ordering Provider:  Jacky Dubois MD    20 mg/kg × 113 kg Intravenous Once 01/12/19 1855 01/12/19 5064     "      Allergies:  has No Known Allergies.    Family History: family history is not on file.    Social History:  reports that  has never smoked. he has never used smokeless tobacco. He reports that he does not drink alcohol.    Review of Systems - not available    Blood pressure 128/72, pulse 86, temperature 98.3 °F (36.8 °C), temperature source Oral, resp. rate 16, height 182.9 cm (72\"), weight 102 kg (225 lb 3.2 oz), SpO2 96 %.  GENERAL: groggy, in no acute distress.   HEENT: Oropharynx without thrush. . No cervical adenopathy. No neck masses  EYES: . No conjunctival injection. No icterus.   LYMPHATICS: No lymphadenopathy of the neck   HEART: No murmur, gallop, or pericardial friction rub.   LUNGS: Clear to auscultation anteriorly. No respiratory distress  ABDOMEN: Soft, nontender, nondistended.   SKIN: Warm and dry  -   arm with nodular lesions  PSYCHIATRIC: Mental status confused  EXT: Heel lesion seen      DIAGNOSTICS:  Lab Results   Component Value Date    WBC 4.57 01/15/2019    HGB 8.1 (L) 01/15/2019    HCT 24.8 (L) 01/15/2019     (L) 01/15/2019     Lab Results   Component Value Date    .4 (H) 06/30/2014     No results found for: SEDRATE  Lab Results   Component Value Date    GLUCOSE 114 (H) 01/16/2019    BUN 68 (H) 01/16/2019    CREATININE 2.09 (H) 01/16/2019    EGFRIFNONA 31 (L) 01/16/2019    BCR 32.5 (H) 01/16/2019    CO2 17.0 (L) 01/16/2019    CALCIUM 7.0 (L) 01/16/2019    ALBUMIN 3.26 01/12/2019    LABIL2 1.0 10/08/2014    AST 13 01/12/2019    ALT 10 01/12/2019       Microbiology: cultures negative so far      RADIOLOGY:  Imaging Results (last 72 hours)     Procedure Component Value Units Date/Time    CT Lower Extremity Left Without Contrast [[id number]] Collected:  01/13/19 0149     Updated:  01/13/19 0256    Narrative:       EXAM:    CT Left Lower Extremity Without IV Contrast, Foot     EXAM DATE/TIME:    1/13/2019 1:49 AM     CLINICAL HISTORY:    83 years old, male; Sepsis, unspecified " organism; Pleural effusion, not   elsewhere classified; Constipation, unspecified; Pressure ulcer of left heel,   stage 3; Urinary tract infection, site not specified; Other ascites; Fever,   unspecified; Pain; Foot; Additional info: Left foot wound, R/O osteo     TECHNIQUE:    CT of the Left lower extremity without intravenous contrast was performed.   Exam focused on the foot.    All CT scans at this facility use at least one of these dose optimization   techniques: automated exposure control; mA and/or kV adjustment per patient   size (includes targeted exams where dose is matched to clinical indication); or   iterative reconstruction.    Coronal and sagittal reformatted images were created and reviewed.     COMPARISON:    No relevant prior studies available.     FINDINGS:    Bones/joints:  Inferior and superior calcaneal osteophytes. DJD of ankle   joint, subtalar joint, multiple mid-tarsal joints, multiple MTP joints, several   IP joints of toes. Some generalized osseous demineralization. Some scattered   degenerative subarticular cysts of ankle and multiple bones of the foot. No   obvious large osseous erosions, but subtle acute demineralization abnormality   could be obscured. If further study is desired, then consider MRI with/without   IV contrast. No dislocation or obvious acute fracture. Degenerative changes and   some scattered demineralization of right ankle and foot, as visualized. Hallux   valgus- metatarsus varus deformity.    Soft tissues:  Skin ulcer with skin irregularity, granular calcifications, and   thickening along posterior heel. Edema/swelling of left ankle and foot. Cannot   exclude cellulitis at heel.  Correlation with CT contrast study or ultrasound   could help rule out any small fluid pocket within the swollen soft tissues of   the heel-distal Achilles region, if desired..  Achilles tendon appears grossly   continuous, but MR correlation would be more precise.    Vasculature:   Atherosclerotic placques scattered along some vessels.       Impression:       1. Skin ulcer with skin irregularity and thickening along posterior heel.    Cannot exclude cellulitis. See above.  2. Soft tissue swelling of ankle and foot.   3. Inferior and superior calcaneal osteophytes.   4. DJD of ankle joint, subtalar joint, multiple mid-tarsal joints, multiple MTP   joints, several IP joints of toes.   5. Some generalized osseous demineralization.  Also, see above.  Sheath   6. No dislocation or obvious acute fracture.   7. Hallux valgus- metatarsus varus deformity.   8. Scattered atherosclerosis.  9.  Additional findings, as above.     THIS DOCUMENT HAS BEEN ELECTRONICALLY SIGNED BY HOLDEN BACON MD    CT Abdomen Pelvis Without Contrast [[id number]] Collected:  01/12/19 1945     Updated:  01/12/19 2153    Addenda:        Note: Critical Findings were discussed with Jacky Roy at 1/12/2019   9:49   PM EST. The report was understood and acknowledged by the healthcare   giver, who   stated patient has elevated WBCs, no history of recent fall or   anticoagulants.    THIS DOCUMENT HAS BEEN ELECTRONICALLY SIGNED BY HOLDEN BACON MD  Signed:  01/12/19 2153 by Holden Bacon MD    Narrative:       EXAM:    CT Abdomen and Pelvis Without Contrast     EXAM DATE/TIME:    1/12/2019 7:45 PM     CLINICAL HISTORY:    83 years old, male; Pain; Abdominal pain; Generalized; Prior surgery;   Additional info: Abdominal distention with vomiting and fever     TECHNIQUE:    Axial computed tomography images of the abdomen and pelvis without contrast.    All CT scans at this facility use at least one of these dose optimization   techniques: automated exposure control; mA and/or kV adjustment per patient   size (includes targeted exams where dose is matched to clinical indication); or   iterative reconstruction.    Coronal reformatted images were created and reviewed.     COMPARISON:    No relevant prior studies  available.     FINDINGS:    Lower thorax:  Old granulomatous disease of lung(s). There are coronary artery   atheromatous calcifications, consistent with CAD. Dystrophic calcifications on   the mitral anulus. Normal heart size. Moderate right pleural effusion. Possible   tiny noncalcified 2 mm nodules versus vessels at lateral right lower lobe   base.( For patients at low risk (minimal or absent history of smoking and of   other known risk factors), no routine follow-up is indicated. For patients at   high risk (history of smoking or of other known risk factors), consider   optional CT at 12 months. (Magy et al., Fleischner Society, 2017).).    Scattered bibasilar subsegmental atelectasis. Mild RLL compressive atelectasis   and/or infiltrate. Some punctate high density possible debris or additional   calcifications in base of right lower lobe. Small hiatal hernia.     ABDOMEN:    Liver:  Evidence of old granulomatous disease of the liver. No hepatomegaly.    Gallbladder and bile ducts: Unremarkable. No calcified stones. No ductal   dilation.    Pancreas:  Pancreatic atrophy.    Spleen: Vascular calcifications. No splenomegaly.    Adrenals:  Borderline in fullness of bilateral adrenals.    Kidneys and ureters:  Mild bilateral perinephric cobwebs/edema. Mild left   renal cortical scarring. No hydronephrosis or nephrolithiasis. No obstructive   uropathy.    Stomach and bowel:  Moderate fecal ball in distended rectum with mild wall   thickening bordered by mild edema, suspect mild stercoral colitis from mild   constipation. There is at least a moderate amount of colonic feces. Gas   scattered in nondilated small bowel. Nonspecific bowel gas. Moderate distention   of stomach containing air and fluid.    Appendix:  Appendix obscured.    Retroperitoneal space:  Oval 3.9 cm x 6.7 cm x 9.1 cm soft tissue density in   right retroperitoneum posterior to the right kidney-parapsoas region bordered   by hazy  density/edema  could represent fluid collection such as hematoma or   abscess. No bubbles of air within this abnormal area. Possible slight swelling   of right psoas muscle. Ultrasound may be helpful to confirm suspected internal   fluid. Clinically correlate. Followup recommended.     PELVIS:    Bladder:  Subtotally contracted bladder restricts wall evaluation. No calculi.    Reproductive:  Enlarged prostate with transverse diameter of 5.7 cm.    Subperitoneal space:  Mild perirectal edema.     ABDOMEN and PELVIS:    Intraperitoneal space:  No significant ascites.    Bones/joints:  Mild scoliosis. Degenerative changes of the spine. DJD of   lateral hips. Mild DJD of bilateral sacroiliac joints. Chronic fracture L2   vertebral body.    Soft tissues:  Mild anasarca. Small 1.1 cm patchy or nodular density in   subcutaneous fat flank RLQ. Scattered areas of mild skin thickening along the   anterior pelvis. Mild laxity of right flank abdominal wall. Mild anasarca.   Slightly more prominent edema or contusion in right flank subcutaneous fat.    Vasculature:  Atherosclerotic placques scattered along some vessels. No aortic   aneurysm. Lower abdominal aortic stent present.    Lymph nodes:  Calcified granulomata within mediastinal lymph node(s) from old   granulomatous disease.  No enlarged pelvic or abdominal lymph nodes.      Impression:       1. CAD.   2. Moderate right pleural effusion.   3. Bibasilar subsegmental atelectasis. Mild RLL compressive atelectasis and/or   infiltrate.   4. Constipation with mild rectal stercoral colitis.   5. Prostate enlargement.   6. Small hiatal hernia.   7. Pancreatic atrophy.   8. Nonspecific bowel gas pattern.   9. Right retroperitoneal 3.9 cm x 6.7 cm x 9.1 cm oval abnormality, possible   fluid collection such as hematoma or abscess. See above.   10. Mild anasarca. Slightly more prominent edema or contusion in right flank   subcutaneous fat.   11.  Suspect mild swelling of the right psoas  muscle.  12.  Additional findings, as above.     THIS DOCUMENT HAS BEEN ELECTRONICALLY SIGNED BY HOLDEN BACON MD          Assessment and Plan:     1.  Severe sepsis- with leukocytosis, elevated PCT, lactic acidosis, acute kidney injury, fever and a known focus of infection.  I suspect that his sepsis is secondary to a urinary tract infection.  2.  Pyuria/UTI-he has yeast on urinalysis and his UTI could be secondary to Candida.  3.  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  We can certainly not exclude a retroperitoneal abscess and if he fails to improve, we will need to consider aspiration of this area.  4.  Left heel decubitus ulcer - for mri  5.  Right knee septic arthritis, s/p I & D  6.  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  Diabetes Mellitus, type 2  8.  Acute/chronic kidney disease, stage III  9.  Severe Debility      PLAN/RECOMMENDATIONS:       Agree with   MRI left heel  rule out osteomyelitis - will review [facility] records this am  Follow  Blood cultures and urine cultures    give  Cefepime IV today - then stop it  Continue   Fluconazole - change to PO 1 more week    Long discussion with family    Case reviewed with Pharmacist  levaquin 250mg daily 1 week as well             Erasmo Cabral MD  1/16/2019

## 2019-01-16 NOTE — PLAN OF CARE
Problem: Fall Risk (Adult)  Goal: Identify Related Risk Factors and Signs and Symptoms  Outcome: Outcome(s) achieved Date Met: 01/16/19   01/15/19 0513   Fall Risk (Adult)   Related Risk Factors (Fall Risk) age-related changes;impaired vision   Signs and Symptoms (Fall Risk) presence of risk factors     Goal: Absence of Fall  Outcome: Ongoing (interventions implemented as appropriate)   01/16/19 1558   Fall Risk (Adult)   Absence of Fall making progress toward outcome       Problem: Patient Care Overview  Goal: Plan of Care Review  Outcome: Ongoing (interventions implemented as appropriate)   01/16/19 0131 01/16/19 1456   Coping/Psychosocial   Plan of Care Reviewed With --  patient;spouse   Plan of Care Review   Progress improving --      Goal: Discharge Needs Assessment  Outcome: Ongoing (interventions implemented as appropriate)   01/14/19 1323   Discharge Needs Assessment   Readmission Within the Last 30 Days no previous admission in last 30 days   Concerns to be Addressed discharge planning   Patient/Family Anticipates Transition to home with help/services   Patient/Family Anticipated Services at Transition home health care   Transportation Concerns car, none   Transportation Anticipated health plan transportation   Anticipated Changes Related to Illness none   Equipment Needed After Discharge none   Discharge Facility/Level of Care Needs home with home health   Current Discharge Risk chronically ill;dependent with mobility/activities of daily living;physical impairment   Disability   Equipment Currently Used at Home hospital bed       Problem: Skin Injury Risk (Adult)  Goal: Identify Related Risk Factors and Signs and Symptoms  Outcome: Outcome(s) achieved Date Met: 01/16/19 01/13/19 0443   Skin Injury Risk (Adult)   Related Risk Factors (Skin Injury Risk) advanced age     Goal: Skin Health and Integrity  Outcome: Ongoing (interventions implemented as appropriate)   01/16/19 1558   Skin Injury Risk (Adult)    Skin Health and Integrity unable to achieve outcome

## 2019-01-16 NOTE — THERAPY TREATMENT NOTE
Acute Care - Physical Therapy Treatment Note  [facility]     Patient Name: Leo Elias  : 1935  MRN: [id number]  Today's Date: 2019  Onset of Illness/Injury or Date of Surgery: 19  Date of Referral to PT: 19  Referring Physician: HOLLY Zhang    Admit Date: 2019    Visit Dx:    ICD-10-CM ICD-9-CM   1. Sepsis due to urinary tract infection (CMS/HCC) A41.9 038.9    N39.0 995.91     599.0   2. Retroperitoneal fluid collection R18.8 789.59   3. Pleural effusion, right J90 511.9   4. Pressure injury of left heel, stage 3 (CMS/HCC) L89.623 707.07     707.23   5. Constipation, unspecified constipation type K59.00 564.00   6. Acute febrile illness R50.9 780.60   7. Impaired mobility and ADLs Z74.09 799.89   8. Impaired functional mobility, balance, gait, and endurance Z74.09 V49.89     Patient Active Problem List   Diagnosis   • Sepsis, unspecified organism (CMS/HCC)   • Hypocalcemia   • Acute renal failure superimposed on stage 3 chronic kidney disease (CMS/HCC)   • Sepsis due to urinary tract infection (CMS/HCC)   • Type 2 diabetes mellitus (CMS/[facility])   • Hypothyroidism (acquired)   • Essential hypertension   • Wound healing, delayed, left foot   • Acute UTI (urinary tract infection)       Therapy Treatment    Rehabilitation Treatment Summary     Row Name 19 1456             Treatment Time/Intention    Discipline  physical therapist  -LM      Document Type  therapy note (daily note)  -LM      Subjective Information  complains of;fatigue;weakness;pain  -      Mode of Treatment  individual therapy;physical therapy  -LM      Patient/Family Observations  Wife present.  -LM      Care Plan Review  care plan/treatment goals reviewed;risks/benefits reviewed;patient/other agree to care plan  -LM      Patient Effort  good  -LM      Existing Precautions/Restrictions  fall L Heel Wound  -LM      Recorded by [LM] Kylie Preciado, PT 19 1528      Row Name 19 2326              Vital Signs    Pre Systolic BP Rehab  -- VSS - RN cleared fr tx  -LM      Pre Patient Position  Supine  -LM      Intra Patient Position  Sitting  -LM      Post Patient Position  Supine  -LM      Recorded by [LM] Kylie Preciado, PT 01/16/19 1528      Row Name 01/16/19 1456             Cognitive Assessment/Intervention    Additional Documentation  Cognitive Assessment/Intervention (Group)  -LM      Recorded by [LM] Kylie Preciado, PT 01/16/19 1528      Row Name 01/16/19 1456             Cognitive Assessment/Intervention- PT/OT    Affect/Mental Status (Cognitive)  WFL  -LM      Orientation Status (Cognition)  oriented x 3  -LM      Follows Commands (Cognition)  follows one step commands;over 90% accuracy;verbal cues/prompting required  -LM      Cognitive Function (Cognitive)  safety deficit  -LM      Safety Deficit (Cognitive)  mild deficit;safety precautions awareness;safety precautions follow-through/compliance  -LM      Personal Safety Interventions  fall prevention program maintained;muscle strengthening facilitated;nonskid shoes/slippers when out of bed  -LM      Recorded by [LM] Kylie Preciado, PT 01/16/19 1528      Row Name 01/16/19 1456             Bed Mobility Assessment/Treatment    Bed Mobility Assessment/Treatment  supine-sit;sit-supine;scooting/bridging  -LM      Scooting/Bridging [place] (Bed Mobility)  dependent (less than 25% patient effort);2 person assist  -LM      Supine-Sit [place] (Bed Mobility)  moderate assist (50% patient effort)  -LM      Sit-Supine [place] (Bed Mobility)  maximum assist (25% patient effort)  -LM      Assistive Device (Bed Mobility)  bed rails;draw sheet;head of bed elevated  -LM      Comment (Bed Mobility)  HOB elevated; Vc's for sequencing; Pt sat EOB ~1 minute.  -LM      Recorded by [LM] Kylie Preciado, PT 01/16/19 1528      Row Name 01/16/19 1456             Motor Skills Assessment/Interventions    Additional Documentation  Balance (Group)  -LM      Recorded by  [LM] Kylie Preciado, PT 01/16/19 1528      Row Name 01/16/19 1456             Therapeutic Exercise    Therapeutic Exercise  supine, lower extremities  -LM      Additional Documentation  Therapeutic Exercise (Row)  -LM      61058 - PT Therapeutic Exercise Minutes  8  -LM      86990 - PT Therapeutic Activity Minutes  15  -LM      Recorded by [LM] Kylie Preciado, PT 01/16/19 1528      Row Name 01/16/19 1456             Lower Extremity Supine Therapeutic Exercise    Performed, Supine Lower Extremity (Therapeutic Exercise)  hip abduction/adduction;SLR (straight leg raise);ankle pumps;heel slides  -LM      Exercise Type, Supine Lower Extremity (Therapeutic Exercise)  AROM (active range of motion);AAROM (active assistive range of motion)  -LM      Sets/Reps Detail, Supine Lower Extremity (Therapeutic Exercise)  x10 bilaterally  -LM      Recorded by [LM] Kylie Preciado, PT 01/16/19 1528      Row Name 01/16/19 1456             Balance    Balance  static sitting balance  -LM      Recorded by [LM] Kylie Preciado, PT 01/16/19 1528      Row Name 01/16/19 1456             Static Sitting Balance    Level of [place] (Unsupported Sitting, Static Balance)  supervision  -LM      Sitting Position (Unsupported Sitting, Static Balance)  sitting on edge of bed  -LM      Time Able to Maintain Position (Unsupported Sitting, Static Balance)  45 to 60 seconds  -LM      Recorded by [LM] Kylie Preciado, PT 01/16/19 1528      Row Name 01/16/19 1456             Positioning and Restraints    Pre-Treatment Position  in bed  -LM      Post Treatment Position  bed  -LM      In Bed  supine;call light within reach;encouraged to call for assist;with family/caregiver;notified nsg  -LM      Recorded by [LM] Kylie Preciado, PT 01/16/19 1528      Row Name 01/16/19 1456             Pain Scale: Numbers Pre/Post-Treatment    Pain Scale: Numbers, Pretreatment  0/10 - no pain  -LM      Pain Scale: Numbers, Post-Treatment  0/10 - no pain  -LM      Recorded by [LM]  Kylie Preciado, PT 01/16/19 1528      Row Name                Wound 01/13/19 0200 medial coccyx pressure injury    Wound - Properties Group Date first assessed: 01/13/19 [KB] Time first assessed: 0200 [KB] Orientation: medial [KB] Location: coccyx [KB] Type: pressure injury [KB] Stage, Pressure Injury: Stage 2 [KB] Recorded by:  [KB] Halley Krueger RN 01/13/19 0240    Row Name                Wound 01/13/19 0200 Left heel    Wound - Properties Group Date first assessed: 01/13/19 [KB] Time first assessed: 0200 [KB] Side: Left [KB] Location: heel [KB] Stage, Pressure Injury: unstageable [KB] Additional Comments: possible stage 4- will reassess once slough has been removed further [HW] Recorded by:  [HW] Corrine Alonzo APRN 01/14/19 1535 [KB] Halley Krueger RN 01/13/19 0241    Row Name 01/16/19 1456             Plan of Care Review    Plan of Care Reviewed With  patient;spouse  -LM      Recorded by [LM] Kylie Preciado, PT 01/16/19 1528      Row Name 01/16/19 1456             Outcome Summary/Treatment Plan (PT)    Daily Summary of Progress (PT)  progress toward functional goals as expected  -LM      Recorded by [LM] Kylie Preciado, PT 01/16/19 1528        User Key  (r) = Recorded By, (t) = Taken By, (c) = Cosigned By    Initials Name Effective Dates Discipline    LM Kylie Preciado, PT 06/15/16 -  PT     Corrine Alonzo, APRYAMEL 05/14/18 -  Physician    Halley Calderón RN 06/16/16 -  Nurse          Wound 01/13/19 0200 medial coccyx pressure injury (Active)   Dressing Appearance open to air 1/16/2019  8:26 AM   Closure Open to air;None 1/16/2019  8:26 AM   Base closed/resurfaced;pink 1/16/2019  8:26 AM   Periwound non-blanchable 1/16/2019  8:26 AM   Periwound Temperature warm 1/16/2019  8:26 AM   Periwound Skin Turgor soft 1/16/2019  8:26 AM   Drainage Amount none 1/16/2019  8:26 AM   Care, Wound cleansed with;soap and water 1/16/2019  8:26 AM       Wound 01/13/19 0200 Left heel (Active)   Dressing  Appearance dry;intact;moist drainage 1/16/2019  8:26 AM   Closure Adhesive bandage 1/16/2019  8:26 AM   Base bleeding;clean;moist;slough;yellow 1/16/2019  8:26 AM   Periwound pale white;moist 1/16/2019  8:26 AM   Periwound Temperature warm 1/16/2019  8:26 AM   Periwound Skin Turgor soft 1/16/2019  8:26 AM   Drainage Characteristics/Odor serosanguineous 1/16/2019  8:26 AM   Drainage Amount small 1/16/2019  8:26 AM   Care, Wound cleansed with;sterile normal saline;irrigated with;moist wound environment maintained 1/16/2019  8:26 AM   Dressing Care, Wound dressing applied;low-adherent;foam;packed with;gauze, iodoform 1/16/2019  8:26 AM           Physical Therapy Education     Title: PT OT SLP Therapies (In Progress)     Topic: Physical Therapy (In Progress)     Point: Mobility training (In Progress)     Learning Progress Summary           Patient Acceptance, E, NR by LM at 1/13/2019  1:59 PM                   Point: Precautions (In Progress)     Learning Progress Summary           Patient Acceptance, E, NR by  at 1/13/2019  1:59 PM                               User Key     Initials Effective Dates Name Provider Type Discipline    GREGORIO 06/15/16 -  Kylie Preciado, PT Physical Therapist PT                PT Recommendation and Plan  Anticipated Discharge Disposition (PT): home with assist, home with home health  Planned Therapy Interventions (PT Eval): balance training, bed mobility training, home exercise program, neuromuscular re-education, patient/family education, postural re-education, ROM (range of motion), strengthening, stretching, transfer training  Therapy Frequency (PT Clinical Impression): 3 times/wk  Outcome Summary/Treatment Plan (PT)  Daily Summary of Progress (PT): progress toward functional goals as expected  Anticipated Discharge Disposition (PT): home with assist, home with home health  Plan of Care Reviewed With: patient, spouse  Outcome Summary: Pt participated well with PT on this date.  With  encouragement, pt agreeable to sitting EOB.  With HOB elevated, pt transferred supine-->sit with ModAx1, sat EOB for ~1 minute with supervision, and transferred sit-->supine with MaxAx1.  Tolerated BLE ther ex well with only complaint being pain in the L knee with movement.  Continue with skilled PT to improve mobility and safety.  Outcome Measures     Row Name 01/16/19 1456 01/15/19 0950          How much help from another person do you currently need...    Turning from your back to your side while in flat bed without using bedrails?  3  -LM  --     Moving from lying on back to sitting on the side of a flat bed without bedrails?  2  -LM  --     Moving to and from a bed to a chair (including a wheelchair)?  1  -LM  --     Standing up from a chair using your arms (e.g., wheelchair, bedside chair)?  1  -LM  --     Climbing 3-5 steps with a railing?  1  -LM  --     To walk in hospital room?  1  -LM  --     AM-PAC 6 Clicks Score  9  -LM  --        How much help from another is currently needed...    Putting on and taking off regular lower body clothing?  --  1  -AC     Bathing (including washing, rinsing, and drying)  --  2  -AC     Toileting (which includes using toilet bed pan or urinal)  --  1  -AC     Putting on and taking off regular upper body clothing  --  2  -AC     Taking care of personal grooming (such as brushing teeth)  --  2  -AC     Eating meals  --  3  -AC     Score  --  11  -AC        Functional Assessment    Outcome Measure Options  AM-PAC 6 Clicks Basic Mobility (PT)  -LM  AM-PAC 6 Clicks Daily Activity (OT)  -AC       User Key  (r) = Recorded By, (t) = Taken By, (c) = Cosigned By    Initials Name Provider Type    AC Shaina Balbuena, OT Occupational Therapist    LM Kylie Preciado, PT Physical Therapist         Time Calculation:   PT Charges     Row Name 01/16/19 1456             Time Calculation    Start Time  1456  -LM      PT Received On  01/16/19  -      PT Goal Re-Cert Due Date  01/23/19  -          Timed Charges    31596 - PT Therapeutic Exercise Minutes  8  -LM      51309 - PT Therapeutic Activity Minutes  15  -LM        User Key  (r) = Recorded By, (t) = Taken By, (c) = Cosigned By    Initials Name Provider Type    LM Kylie Preciado, PT Physical Therapist        Therapy Suggested Charges     Code   Minutes Charges    51789 (CPT®) Hc Pt Neuromusc Re Education Ea 15 Min      78256 (CPT®) Hc Pt Ther Proc Ea 15 Min 8 1    56569 (CPT®) Hc Gait Training Ea 15 Min      31363 (CPT®) Hc Pt Therapeutic Act Ea 15 Min 15 1    11875 (CPT®) Hc Pt Manual Therapy Ea 15 Min      94931 (CPT®) Hc Pt Iontophoresis Ea 15 Min      65987 (CPT®) Hc Pt Elec Stim Ea-Per 15 Min      81987 (CPT®) Hc Pt Ultrasound Ea 15 Min      76486 (CPT®) Hc Pt Self Care/Mgmt/Train Ea 15 Min      51823 (CPT®) Hc Pt Prosthetic (S) Train Initial Encounter, Each 15 Min      72428 (CPT®) Hc Pt Orthotic(S)/Prosthetic(S) Encounter, Each 15 Min      87735 (CPT®) Hc Orthotic(S) Mgmt/Train Initial Encounter, Each 15min      Total  23 2        Therapy Charges for Today     Code Description Service Date Service Provider Modifiers Qty    47736978057 HC PT THERAPEUTIC ACT EA 15 MIN 1/16/2019 Kylie Preciado, PT GP 1    18463284334 HC PT THER PROC EA 15 MIN 1/16/2019 Kylie Preciado, PT GP 1          PT G-Codes  Outcome Measure Options: AM-PAC 6 Clicks Basic Mobility (PT)  AM-PAC 6 Clicks Score: 9  Score: 11    Kylie Preciado PT  1/16/2019

## 2019-01-16 NOTE — PROGRESS NOTES
Continued Stay Note  [facility]     Patient Name: Leo Elias  MRN: [id number]  Today's Date: 1/16/2019    Admit Date: 1/12/2019    Discharge Plan     Row Name 01/16/19 1518       Plan    Plan  Home with     Patient/Family in Agreement with Plan  yes    Plan Comments  [facility] ambulance arranged for 1500 on 1-17-19 to transport home if ready. I called Giselle at [facility] and notified her of the resume  orders. She has access to [facility]. The pt and spouse have been updated and agree with the plan. No new DC needs per spouse.        Discharge Codes    No documentation.       Expected Discharge Date and Time     Expected Discharge Date Expected Discharge Time    Jan 18, 2019             Tawny Bowen RN

## 2019-01-16 NOTE — PROGRESS NOTES
[facility] Medicine Services  PROGRESS NOTE    Patient Name: Leo Elias  : 1935  MRN: [id number]    Date of Admission: 2019  Length of Stay: 3  Primary Care Physician: Nydia Mejias MD    Subjective   Subjective     CC: f/u weakness    HPI: Lying in bed with wife at bedside. Patient hoping to go home soon. Denies pain.     Review of Systems  Gen- No fevers, chills  CV- No chest pain, palpitations  Resp- No cough, dyspnea  GI- No N/V/D, abd pain    Otherwise ROS is negative except as mentioned in the HPI.    Objective   Objective     Vital Signs:   Temp:  [98.3 °F (36.8 °C)] 98.3 °F (36.8 °C)  Heart Rate:  [86] 86  Resp:  [16] 16  BP: (128)/(72) 128/72        Physical Exam:  Constitutional: No acute distress, awake, alert, chronically ill appearing  HENT: NCAT, mucous membranes moist  Respiratory: Clear to auscultation bilaterally, respiratory effort normal   Cardiovascular: RRR, no murmurs, rubs, or gallops, palpable pedal pulses bilaterally  Gastrointestinal: Positive bowel sounds, soft, nontender, nondistended  Musculoskeletal: Left heel dressed and foot in boot. Right arm nodular lesions noted.  Psychiatric: Appropriate affect, cooperative  Neurologic: Oriented x 3, strength symmetric in all extremities, Cranial Nerves grossly intact to confrontation, speech clear  Skin: No rashes    Results Reviewed:  I have personally reviewed current lab, radiology, and data and agree.    Results from last 7 days   Lab Units 01/15/19  1152 19  0526 19  1827   WBC 10*3/mm3 4.57 12.30* 18.20*   HEMOGLOBIN g/dL 8.1* 10.0* 10.6*   HEMATOCRIT % 24.8* 30.7* 32.3*   PLATELETS 10*3/mm3 130* 221 270     Results from last 7 days   Lab Units 19  0427 01/15/19  1152 19  0541  19  1827   SODIUM mmol/L 135 134 133   < > 132   POTASSIUM mmol/L 3.5 3.4* 3.6   < > 3.8   CHLORIDE mmol/L 111* 110* 107   < > 104   CO2 mmol/L 17.0* 16.0* 16.0*   < > 18.0*   BUN mg/dL 68*  69* 80*   < > 86*   CREATININE mg/dL 2.09* 2.31* 2.43*   < > 2.38*   GLUCOSE mg/dL 114* 118* 73   < > 170*   CALCIUM mg/dL 7.0* 7.1* 7.6*   < > 8.4*   ALT (SGPT) U/L  --   --   --   --  10   AST (SGOT) U/L  --   --   --   --  13    < > = values in this interval not displayed.     Estimated Creatinine Clearance: 33.7 mL/min (A) (by C-G formula based on SCr of 2.09 mg/dL (H)).    No results found for: BNP    Microbiology Results Abnormal     Procedure Component Value - Date/Time    Blood Culture - Blood, Arm, Right [[id number]] Collected:  01/12/19 1904    Lab Status:  Preliminary result Specimen:  Blood from Arm, Right Updated:  01/15/19 2000     Blood Culture No growth at 3 days    Blood Culture - Blood, Arm, Left [[id number]] Collected:  01/12/19 1904    Lab Status:  Preliminary result Specimen:  Blood from Arm, Left Updated:  01/15/19 2000     Blood Culture No growth at 3 days    Urine Culture - Urine, Urine, Catheter In/Out [[id number]]  (Normal) Collected:  01/12/19 1921    Lab Status:  Final result Specimen:  Urine, Catheter In/Out Updated:  01/14/19 0746     Urine Culture No growth at 2 days    Eosinophil Smear - Urine, Urine, Clean Catch [[id number]]  (Normal) Collected:  01/13/19 1846    Lab Status:  Final result Specimen:  Urine, Clean Catch Updated:  01/13/19 1955     Eosinophil Smear 0 % EOS/100 Cells     Narrative:       No eosinophil seen    Influenza A & B, RT PCR - Swab, Nasopharynx [[id number]]  (Normal) Collected:  01/12/19 1842    Lab Status:  Final result Specimen:  Swab from Nasopharynx Updated:  01/1935     Influenza A PCR Not Detected     Influenza B PCR Not Detected          Imaging Results (last 24 hours)     ** No results found for the last 24 hours. **               I have reviewed the medications:    Current Facility-Administered Medications:   •  castor oil-balsam peru (VENELEX) ointment, , Topical, Q12H, Thee Hernandez MD  •  castor oil-balsam peru (VENELEX) ointment, , Topical, PRN,  Thee Hernandez MD  •  cefepime (MAXIPIME) 2 g/100 mL 0.9% NS (mbp), 2 g, Intravenous, Q24H, Erasmo Cabral MD, 2 g at 01/16/19 1023  •  dextrose (D50W) 25 g/ 50mL Intravenous Solution 25 g, 25 g, Intravenous, Q15 Min PRN, Criss Miller APRN  •  dextrose (GLUTOSE) oral gel 15 g, 15 g, Oral, Q15 Min PRN, Criss Miller APRN  •  finasteride (PROSCAR) tablet 5 mg, 5 mg, Oral, Daily, Trinidad Serrano II, DO, 5 mg at 01/16/19 0826  •  [START ON 1/17/2019] fluconazole (DIFLUCAN) tablet 200 mg, 200 mg, Oral, Q24H, Erasmo Cabral MD  •  glucagon (human recombinant) (GLUCAGEN DIAGNOSTIC) injection 1 mg, 1 mg, Subcutaneous, PRN, Criss Miller APRN  •  heparin (porcine) 5000 UNIT/ML injection 5,000 Units, 5,000 Units, Subcutaneous, Q12H, Criss Miller APRN, 5,000 Units at 01/16/19 0826  •  hydrOXYzine (ATARAX) tablet 25 mg, 25 mg, Oral, BID, Criss Miller APRN, 25 mg at 01/16/19 0826  •  insulin lispro (humaLOG) injection 0-7 Units, 0-7 Units, Subcutaneous, 4x Daily With Meals & Nightly, Criss Miller APRN, 3 Units at 01/15/19 2048  •  lactobacillus acidophilus (RISAQUAD) capsule 1 capsule, 1 capsule, Oral, Daily, Criss Miller APRN, 1 capsule at 01/16/19 0826  •  [START ON 1/17/2019] levoFLOXacin (LEVAQUIN) tablet 250 mg, 250 mg, Oral, Q24H, Erasmo Cabral MD  •  levothyroxine (SYNTHROID, LEVOTHROID) tablet 50 mcg, 50 mcg, Oral, Daily, Criss Miller APRN, 50 mcg at 01/16/19 0438  •  Magnesium Sulfate 2 gram Bolus, followed by 8 gram infusion (total Mg dose 10 grams)- Mg less than or equal to 1mg/dL, 2 g, Intravenous, PRN **OR** Magnesium Sulfate 2 gram / 50mL Infusion (GIVE X 3 BAGS TO EQUAL 6GM TOTAL DOSE) - Mg 1.1 - 1.5 mg/dl, 2 g, Intravenous, PRN **OR** Magnesium Sulfate 4 gram infusion- Mg 1.6-1.9 mg/dL, 4 g, Intravenous, PRN, Trinidad Serrano II, DO  •  melatonin sublingual tablet 5 mg, 5 mg, Sublingual, Nightly PRN, Criss Miller, APRN, 5 mg at 01/13/19  0137  •  ondansetron (ZOFRAN) injection 4 mg, 4 mg, Intravenous, Q6H PRN, Criss Miller, APRN, 4 mg at 01/13/19 0539  •  Pharmacy Consult - Pharmacy to dose, , Does not apply, Continuous PRN, Tawny Gleason APRN  •  potassium chloride (MICRO-K) CR capsule 40 mEq, 40 mEq, Oral, PRN **OR** potassium chloride (KLOR-CON) packet 40 mEq, 40 mEq, Oral, PRN **OR** potassium chloride 10 mEq in 100 mL IVPB, 10 mEq, Intravenous, Q1H PRN, Trinidad Serrano II, DO  •  sodium chloride 0.9 % flush 10 mL, 10 mL, Intravenous, PRN, Jacky Dubois MD  •  sodium chloride 0.9 % flush 3 mL, 3 mL, Intravenous, Q12H, Criss Miller, APRN, 3 mL at 01/15/19 2043  •  sodium chloride 0.9 % flush 3-10 mL, 3-10 mL, Intravenous, PRN, Criss Miller W, APRN  •  Sodium chloride 0.9 % infusion, 100 mL/hr, Intravenous, Continuous, Criss Miller, APRN, Last Rate: 100 mL/hr at 01/16/19 0438, 100 mL/hr at 01/16/19 0438  •  tamsulosin (FLOMAX) 24 hr capsule 0.8 mg, 0.8 mg, Oral, Nightly, Thee Hernandez MD, 0.8 mg at 01/15/19 2042      Assessment/Plan   Assessment / Plan     Active Hospital Problems    Diagnosis Date Noted   • **Sepsis, unspecified organism (CMS/[facility]) [A41.9] 01/12/2019   • Hypocalcemia [E83.51] 01/12/2019   • Acute renal failure superimposed on stage 3 chronic kidney disease (CMS/[facility]) [N17.9, N18.3] 01/12/2019   • Sepsis due to urinary tract infection (CMS/[facility]) [A41.9, N39.0] 01/12/2019   • Type 2 diabetes mellitus (CMS/[facility]) [E11.9] 01/12/2019   • Hypothyroidism (acquired) [E03.9] 01/12/2019   • Essential hypertension [I10] 01/12/2019   • Wound healing, delayed, left foot [T14.8XXD] 01/12/2019   • Acute UTI (urinary tract infection) [N39.0] 01/12/2019          Brief Hospital Course to date:  Leo Elias is a 83 y.o. male chronically ill male presenting with sepsis presumably due to UTI.     A/P:  --Long d/w patient's wife today. At this time it is most likely that patient's presentation due to UTI  (having ongoing retention.) Continue IV cefepime + fluconazole. D/W Dr. Cabral today, planning to transition to PO abx in hopes of discharge soon.  --Needs MRI left foot to r/o osteomyelitis which is pending. I can see no reason that MRI would be contraindicated and infact MRI was previously ordered at [facility]. Records from there are pending.  --Per Dr. AVALOS's note, it is unlikely that his flank hematoma is infected though this cannot be excluded. If he continues to have fevers/symptoms despite treatment of above, this may need to be readdressed.  --Also of note patient has multiple nodular lesions of his RUE (some of which may have resolved per his wife.) At one point, he was told these were sarcoma but apparently at [facility] he was told these are melanoma. Awaiting records from [facility] though it will not  as patient and his wife would wish for no treatment in either case.  --Creatinine at baseline.  --Christiana mccullough for retention probably related to UTI. Voiding trial prior to d/c. Continue flomax. Add proscar.  --Blood glucose controlled. Continue current regimen.  --PT wound.     DVT Prophylaxis: [facility]     Disposition: I expect the patient to be discharged home in 2-4 days.    CODE STATUS:   Code Status and Medical Interventions:   Ordered at: 01/12/19 6953     Limited Support to NOT Include:    Intubation     Level Of Support Discussed With:    Patient     Code Status:    No CPR     Medical Interventions (Level of Support Prior to Arrest):    Limited       Electronically signed by Trinidad Serrano II, DO, 01/16/19, 11:46 AM.

## 2019-01-16 NOTE — PLAN OF CARE
Problem: Patient Care Overview  Goal: Plan of Care Review  Outcome: Ongoing (interventions implemented as appropriate)   01/16/19 3935   Coping/Psychosocial   Plan of Care Reviewed With patient;spouse   OTHER   Outcome Summary Pt participated well with PT on this date. With encouragement, pt agreeable to sitting EOB. With HOB elevated, pt transferred supine-->sit with ModAx1, sat EOB for ~1 minute with supervision, and transferred sit-->supine with MaxAx1. Tolerated BLE ther ex well with only complaint being pain in the L knee with movement. Continue with skilled PT to improve mobility and safety.

## 2019-01-17 VITALS
DIASTOLIC BLOOD PRESSURE: 77 MMHG | BODY MASS INDEX: 30.71 KG/M2 | RESPIRATION RATE: 17 BRPM | OXYGEN SATURATION: 96 % | HEART RATE: 78 BPM | TEMPERATURE: 98 F | HEIGHT: 72 IN | SYSTOLIC BLOOD PRESSURE: 134 MMHG | WEIGHT: 226.7 LBS

## 2019-01-17 LAB
ANION GAP SERPL CALCULATED.3IONS-SCNC: 6 MMOL/L (ref 3–11)
BACTERIA SPEC AEROBE CULT: NORMAL
BACTERIA SPEC AEROBE CULT: NORMAL
BUN BLD-MCNC: 66 MG/DL (ref 9–23)
BUN/CREAT SERPL: 35.7 (ref 7–25)
CALCIUM SPEC-SCNC: 7.1 MG/DL (ref 8.7–10.4)
CHLORIDE SERPL-SCNC: 111 MMOL/L (ref 99–109)
CO2 SERPL-SCNC: 17 MMOL/L (ref 20–31)
CREAT BLD-MCNC: 1.85 MG/DL (ref 0.6–1.3)
GFR SERPL CREATININE-BSD FRML MDRD: 35 ML/MIN/1.73
GLUCOSE BLD-MCNC: 125 MG/DL (ref 70–100)
GLUCOSE BLDC GLUCOMTR-MCNC: 139 MG/DL (ref 70–130)
GLUCOSE BLDC GLUCOMTR-MCNC: 213 MG/DL (ref 70–130)
GLUCOSE BLDC GLUCOMTR-MCNC: 219 MG/DL (ref 70–130)
MAGNESIUM SERPL-MCNC: 2.3 MG/DL (ref 1.3–2.7)
POTASSIUM BLD-SCNC: 4 MMOL/L (ref 3.5–5.5)
SODIUM BLD-SCNC: 134 MMOL/L (ref 132–146)

## 2019-01-17 PROCEDURE — 99239 HOSP IP/OBS DSCHRG MGMT >30: CPT | Performed by: INTERNAL MEDICINE

## 2019-01-17 PROCEDURE — 80048 BASIC METABOLIC PNL TOTAL CA: CPT

## 2019-01-17 PROCEDURE — 82962 GLUCOSE BLOOD TEST: CPT

## 2019-01-17 PROCEDURE — 83735 ASSAY OF MAGNESIUM: CPT

## 2019-01-17 PROCEDURE — 25010000002 HEPARIN (PORCINE) PER 1000 UNITS: Performed by: NURSE PRACTITIONER

## 2019-01-17 RX ORDER — CASTOR OIL AND BALSAM, PERU 788; 87 MG/G; MG/G
1 OINTMENT TOPICAL EVERY 12 HOURS SCHEDULED
Qty: 60 G | Refills: 0 | Status: SHIPPED | OUTPATIENT
Start: 2019-01-17

## 2019-01-17 RX ORDER — POLYETHYLENE GLYCOL 3350 17 G/17G
17 POWDER, FOR SOLUTION ORAL 2 TIMES DAILY
Qty: 1020 G | Refills: 0 | Status: SHIPPED | OUTPATIENT
Start: 2019-01-17

## 2019-01-17 RX ORDER — AMOXICILLIN 250 MG
2 CAPSULE ORAL DAILY
Qty: 60 TABLET | Refills: 0 | Status: SHIPPED | OUTPATIENT
Start: 2019-01-17

## 2019-01-17 RX ORDER — FINASTERIDE 5 MG/1
5 TABLET, FILM COATED ORAL DAILY
Qty: 30 TABLET | Refills: 0 | Status: SHIPPED | OUTPATIENT
Start: 2019-01-18

## 2019-01-17 RX ORDER — FLUCONAZOLE 200 MG/1
200 TABLET ORAL EVERY 24 HOURS
Qty: 6 TABLET | Refills: 0 | Status: SHIPPED | OUTPATIENT
Start: 2019-01-18 | End: 2019-01-17

## 2019-01-17 RX ORDER — CASTOR OIL AND BALSAM, PERU 788; 87 MG/G; MG/G
1 OINTMENT TOPICAL EVERY 12 HOURS SCHEDULED
Qty: 60 G | Refills: 0 | Status: SHIPPED | OUTPATIENT
Start: 2019-01-17 | End: 2019-01-17

## 2019-01-17 RX ORDER — LACTULOSE 10 G/15ML
20 SOLUTION ORAL DAILY PRN
Qty: 473 ML | Refills: 0 | Status: SHIPPED | OUTPATIENT
Start: 2019-01-17

## 2019-01-17 RX ORDER — POLYETHYLENE GLYCOL 3350 17 G/17G
17 POWDER, FOR SOLUTION ORAL 2 TIMES DAILY
Qty: 1020 G | Refills: 0 | Status: SHIPPED | OUTPATIENT
Start: 2019-01-17 | End: 2019-01-17 | Stop reason: SDUPTHER

## 2019-01-17 RX ORDER — LACTULOSE 10 G/15ML
20 SOLUTION ORAL DAILY PRN
Qty: 473 ML | Refills: 0 | Status: SHIPPED | OUTPATIENT
Start: 2019-01-17 | End: 2019-01-17 | Stop reason: SDUPTHER

## 2019-01-17 RX ORDER — FINASTERIDE 5 MG/1
5 TABLET, FILM COATED ORAL DAILY
Qty: 30 TABLET | Refills: 0 | Status: SHIPPED | OUTPATIENT
Start: 2019-01-18 | End: 2019-01-17

## 2019-01-17 RX ORDER — LEVOFLOXACIN 250 MG/1
250 TABLET ORAL EVERY 24 HOURS
Qty: 7 TABLET | Refills: 0 | Status: SHIPPED | OUTPATIENT
Start: 2019-01-17 | End: 2019-01-17

## 2019-01-17 RX ORDER — LEVOFLOXACIN 250 MG/1
250 TABLET ORAL EVERY 24 HOURS
Qty: 7 TABLET | Refills: 0 | Status: SHIPPED | OUTPATIENT
Start: 2019-01-17 | End: 2019-01-24

## 2019-01-17 RX ORDER — FLUCONAZOLE 200 MG/1
200 TABLET ORAL EVERY 24 HOURS
Qty: 6 TABLET | Refills: 0 | Status: SHIPPED | OUTPATIENT
Start: 2019-01-18 | End: 2019-01-24

## 2019-01-17 RX ORDER — AMOXICILLIN 250 MG
2 CAPSULE ORAL DAILY
Qty: 60 TABLET | Refills: 0 | Status: SHIPPED | OUTPATIENT
Start: 2019-01-17 | End: 2019-01-17 | Stop reason: SDUPTHER

## 2019-01-17 RX ADMIN — HEPARIN SODIUM 5000 UNITS: 5000 INJECTION INTRAVENOUS; SUBCUTANEOUS at 08:20

## 2019-01-17 RX ADMIN — FINASTERIDE 5 MG: 5 TABLET, FILM COATED ORAL at 08:20

## 2019-01-17 RX ADMIN — HYDROXYZINE HYDROCHLORIDE 25 MG: 25 TABLET, FILM COATED ORAL at 08:20

## 2019-01-17 RX ADMIN — FLUCONAZOLE 200 MG: 200 TABLET ORAL at 08:20

## 2019-01-17 RX ADMIN — LEVOFLOXACIN 250 MG: 250 TABLET, FILM COATED ORAL at 12:09

## 2019-01-17 RX ADMIN — SODIUM CHLORIDE 100 ML/HR: 9 INJECTION, SOLUTION INTRAVENOUS at 06:05

## 2019-01-17 RX ADMIN — LEVOTHYROXINE SODIUM 50 MCG: 50 TABLET ORAL at 06:05

## 2019-01-17 RX ADMIN — Medication 1 CAPSULE: at 08:20

## 2019-01-17 RX ADMIN — INSULIN LISPRO 3 UNITS: 100 INJECTION, SOLUTION INTRAVENOUS; SUBCUTANEOUS at 12:09

## 2019-01-17 RX ADMIN — CASTOR OIL AND BALSAM, PERU: 788; 87 OINTMENT TOPICAL at 08:21

## 2019-01-17 NOTE — PLAN OF CARE
Problem: Patient Care Overview  Goal: Plan of Care Review  Outcome: Ongoing (interventions implemented as appropriate)   01/17/19 1032   Coping/Psychosocial   Plan of Care Reviewed With spouse   OTHER   Outcome Summary Discharge wound supplies provided. Talked with spouse about care needs. Wound care and dressing changes to be done every third day and PRN with excess soiling. HH can help with dressing changes and wound care. Please see orders for care needs. Thanks    Plan of Care Review   Progress no change

## 2019-01-17 NOTE — PROGRESS NOTES
Continued Stay Note   [place]     Patient Name: Leo Elias  MRN: [id number]  Today's Date: 1/17/2019    Admit Date: 1/12/2019    Discharge Plan     Row Name 01/17/19 1057       Plan    Plan Comments  I notified Verónica at [facility] of possible DC today and the spouse request for a  nurse who is familar with wound care.  has access to [facility]. [facility] ambulance is arranged for 1500 to the [facility] home.. Please reschedule if not ready for DC today [phone].        Discharge Codes    No documentation.       Expected Discharge Date and Time     Expected Discharge Date Expected Discharge Time    Jan 18, 2019             Tawny Bowen RN

## 2019-01-17 NOTE — NURSING NOTE
Report received from OMAR Cummins RN. Care assumed of patient at this time. Agree with previous assessment. Will continue to assess.

## 2019-01-17 NOTE — PROGRESS NOTES
Case Management Discharge Note    Final Note: home with [facility] via [facility] @1500    Destination      No service has been selected for the patient.      Durable Medical Equipment      No service has been selected for the patient.      Dialysis/Infusion      No service has been selected for the patient.      Home Medical Care - Selection Complete      Service Provider Request Status Selected Services Address Phone Number Fax Number    [place]  [facility] [address] [address] [phone]      Community Resources      No service has been selected for the patient.             Final Discharge Disposition Code: 06 - home with home health care

## 2019-01-17 NOTE — DISCHARGE SUMMARY
[facility] Medicine Services  DISCHARGE SUMMARY    Patient Name: Leo Elias  : 1935  MRN: [id number]    Date of Admission: 2019  Date of Discharge:  2019  Primary Care Physician: Nydia Mejias MD    Consults     Date and Time Order Name Status Description    2019 0030 Inpatient Infectious Diseases Consult Completed     2019 0116 Inpatient General Surgery Consult Completed           Hospital Course     Presenting Problem:   Sepsis due to urinary tract infection (CMS/HCC) [A41.9, N39.0]    Active Hospital Problems    Diagnosis Date Noted   • **Sepsis, unspecified organism (CMS/HCC) [A41.9] 2019   • Hypocalcemia [E83.51] 2019   • Acute renal failure superimposed on stage 3 chronic kidney disease (CMS/HCC) [N17.9, N18.3] 2019   • Sepsis due to urinary tract infection (CMS/HCC) [A41.9, N39.0] 2019   • Type 2 diabetes mellitus (CMS/HCC) [E11.9] 2019   • Hypothyroidism (acquired) [E03.9] 2019   • Essential hypertension [I10] 2019   • Wound healing, delayed, left foot [T14.8XXD] 2019   • Acute UTI (urinary tract infection) [N39.0] 2019      Resolved Hospital Problems   No resolved problems to display.          Hospital Course:  Leo Elias is a 83 y.o. male admitted with sepsis due to UTI.    Sepsis due to UTI w/ CONCHIS on CKD III: 84 y/o chronically ill man who has gotten the entirety of his medical care at  presenting here with above. Upon arrival patient noted to have sepsis of urinary source. His blood cultures remained negative but his urine appeared infected with yeast and bacteria. His urine culture did not grow any bacteria however given his rapid improvement this was presumed to be source. ID was consulted given complex history who recommened PO diflucan and PO levaquin at d/c for 7 more days. Patient also had CONCHIS on CKD III related to sepsis which resolved to baseline with IV fluids and treatment of his  UTI. He should see Dr. Mejias in 1 week and Dr. Cabral in 2 weeks.      Urinary retention: Likely related to UTI as above. Patient was unable to void spontaneously with voiding trial so will continue PO flomax, finasteride, and catheter in place. He can follow up with urology in 2 weeks once his urinary infection has been treated.    **There was some concern upon arrival that patient had alternative source of infection. He was noted to have flank hematoma. He was seen by general surgery for this but given improvement as above this was felt to not be infected but rather resolving hematoma related to recent trauma. There was also concern given regarding a left heel ulcer. He was unable to complete MRI here due to unclear procedure that he had at . As above since he improved with antibiotic therapy quickly it was felt most likely that his infection was due to UTI rather than another source. As stated above, he will follow up with Dr. Cabral in 2 weeks. I discussed the case with him prior to discharge.    Discharge Follow Up Recommendations for labs/diagnostics:   PCP in 1 week   Dr. Cabral in 2 weeks   Urology in 2 weeks    Day of Discharge     HPI: Up in bed. Wife at bedside. States he feels well and wants to go home.    Review of Systems  Gen- No fevers, chills  CV- No chest pain, palpitations  Resp- No cough, dyspnea  GI- No N/V/D, abd pain    Otherwise ROS is negative except as mentioned in the HPI.    Vital Signs:   Temp:  [97.5 °F (36.4 °C)-98 °F (36.7 °C)] 98 °F (36.7 °C)  Heart Rate:  [78] 78  Resp:  [17] 17  BP: (134-140)/(77) 134/77     Physical Exam:  Constitutional: No acute distress, awake, alert, chronically ill appearing  HENT: NCAT, mucous membranes moist  Respiratory: Clear to auscultation bilaterally, respiratory effort normal   Cardiovascular: RRR, no murmurs, rubs, or gallops, palpable pedal pulses bilaterally  Gastrointestinal: Positive bowel sounds, soft, nontender, nondistended  Musculoskeletal:  Right nodular lesions on arm  Psychiatric: Appropriate affect, cooperative  Neurologic: Oriented x 3, strength symmetric in all extremities, Cranial Nerves grossly intact to confrontation, speech clear  Skin: No rashes    Pertinent  and/or Most Recent Results     Results from last 7 days   Lab Units 01/17/19  0502 01/16/19  2058 01/16/19  0427 01/15/19  1152 01/14/19  0541 01/13/19  0526 01/12/19  1827   WBC 10*3/mm3  --   --   --  4.57  --  12.30* 18.20*   HEMOGLOBIN g/dL  --   --   --  8.1*  --  10.0* 10.6*   HEMATOCRIT %  --   --   --  24.8*  --  30.7* 32.3*   PLATELETS 10*3/mm3  --   --   --  130*  --  221 270   SODIUM mmol/L 134  --  135 134 133 133 132   POTASSIUM mmol/L 4.0 3.7 3.5 3.4* 3.6 4.0 3.8   CHLORIDE mmol/L 111*  --  111* 110* 107 106 104   CO2 mmol/L 17.0*  --  17.0* 16.0* 16.0* 18.0* 18.0*   BUN mg/dL 66*  --  68* 69* 80* 80* 86*   CREATININE mg/dL 1.85*  --  2.09* 2.31* 2.43* 2.31* 2.38*   GLUCOSE mg/dL 125*  --  114* 118* 73 95 170*   CALCIUM mg/dL 7.1*  --  7.0* 7.1* 7.6* 8.2* 8.4*     Results from last 7 days   Lab Units 01/12/19  1827   BILIRUBIN mg/dL 0.2*   ALK PHOS U/L 74   ALT (SGPT) U/L 10   AST (SGOT) U/L 13           Invalid input(s): TG, LDLCALC, LDLREALC  Results from last 7 days   Lab Units 01/13/19  0526   HEMOGLOBIN A1C % 5.60       Brief Urine Lab Results  (Last result in the past 365 days)      Color   Clarity   Blood   Leuk Est   Nitrite   Protein   CREAT   Urine HCG        01/13/19 1846             34.6             Microbiology Results Abnormal     Procedure Component Value - Date/Time    Blood Culture - Blood, Arm, Right [[id number]] Collected:  01/12/19 1904    Lab Status:  Preliminary result Specimen:  Blood from Arm, Right Updated:  01/16/19 2000     Blood Culture No growth at 4 days    Blood Culture - Blood, Arm, Left [[id number]] Collected:  01/12/19 1904    Lab Status:  Preliminary result Specimen:  Blood from Arm, Left Updated:  01/16/19 2000     Blood Culture No growth at  4 days    Urine Culture - Urine, Urine, Catheter In/Out [[id number]]  (Normal) Collected:  01/12/19 1921    Lab Status:  Final result Specimen:  Urine, Catheter In/Out Updated:  01/14/19 0746     Urine Culture No growth at 2 days    Eosinophil Smear - Urine, Urine, Clean Catch [[id number]]  (Normal) Collected:  01/13/19 1846    Lab Status:  Final result Specimen:  Urine, Clean Catch Updated:  01/13/19 1955     Eosinophil Smear 0 % EOS/100 Cells     Narrative:       No eosinophil seen    Influenza A & B, RT PCR - Swab, Nasopharynx [[id number]]  (Normal) Collected:  01/12/19 1842    Lab Status:  Final result Specimen:  Swab from Nasopharynx Updated:  01/1935     Influenza A PCR Not Detected     Influenza B PCR Not Detected          Imaging Results (all)     Procedure Component Value Units Date/Time    CT Lower Extremity Left Without Contrast [[id number]] Collected:  01/13/19 0149     Updated:  01/13/19 0256    Narrative:       EXAM:    CT Left Lower Extremity Without IV Contrast, Foot     EXAM DATE/TIME:    1/13/2019 1:49 AM     CLINICAL HISTORY:    83 years old, male; Sepsis, unspecified organism; Pleural effusion, not   elsewhere classified; Constipation, unspecified; Pressure ulcer of left heel,   stage 3; Urinary tract infection, site not specified; Other ascites; Fever,   unspecified; Pain; Foot; Additional info: Left foot wound, R/O osteo     TECHNIQUE:    CT of the Left lower extremity without intravenous contrast was performed.   Exam focused on the foot.    All CT scans at this facility use at least one of these dose optimization   techniques: automated exposure control; mA and/or kV adjustment per patient   size (includes targeted exams where dose is matched to clinical indication); or   iterative reconstruction.    Coronal and sagittal reformatted images were created and reviewed.     COMPARISON:    No relevant prior studies available.     FINDINGS:    Bones/joints:  Inferior and superior calcaneal  osteophytes. DJD of ankle   joint, subtalar joint, multiple mid-tarsal joints, multiple MTP joints, several   IP joints of toes. Some generalized osseous demineralization. Some scattered   degenerative subarticular cysts of ankle and multiple bones of the foot. No   obvious large osseous erosions, but subtle acute demineralization abnormality   could be obscured. If further study is desired, then consider MRI with/without   IV contrast. No dislocation or obvious acute fracture. Degenerative changes and   some scattered demineralization of right ankle and foot, as visualized. Hallux   valgus- metatarsus varus deformity.    Soft tissues:  Skin ulcer with skin irregularity, granular calcifications, and   thickening along posterior heel. Edema/swelling of left ankle and foot. Cannot   exclude cellulitis at heel.  Correlation with CT contrast study or ultrasound   could help rule out any small fluid pocket within the swollen soft tissues of   the heel-distal Achilles region, if desired..  Achilles tendon appears grossly   continuous, but MR correlation would be more precise.    Vasculature:  Atherosclerotic placques scattered along some vessels.       Impression:       1. Skin ulcer with skin irregularity and thickening along posterior heel.    Cannot exclude cellulitis. See above.  2. Soft tissue swelling of ankle and foot.   3. Inferior and superior calcaneal osteophytes.   4. DJD of ankle joint, subtalar joint, multiple mid-tarsal joints, multiple MTP   joints, several IP joints of toes.   5. Some generalized osseous demineralization.  Also, see above.  Sheath   6. No dislocation or obvious acute fracture.   7. Hallux valgus- metatarsus varus deformity.   8. Scattered atherosclerosis.  9.  Additional findings, as above.     THIS DOCUMENT HAS BEEN ELECTRONICALLY SIGNED BY HOLDEN BACON MD    CT Abdomen Pelvis Without Contrast [[id number]] Collected:  01/12/19 1945     Updated:  01/12/19 2153    Addenda:        Note:  Critical Findings were discussed with Jacky Roy at 1/12/2019   9:49   PM EST. The report was understood and acknowledged by the healthcare   giver, who   stated patient has elevated WBCs, no history of recent fall or   anticoagulants.    THIS DOCUMENT HAS BEEN ELECTRONICALLY SIGNED BY HOLDEN BACON MD  Signed:  01/12/19 2153 by Holden Bacon MD    Narrative:       EXAM:    CT Abdomen and Pelvis Without Contrast     EXAM DATE/TIME:    1/12/2019 7:45 PM     CLINICAL HISTORY:    83 years old, male; Pain; Abdominal pain; Generalized; Prior surgery;   Additional info: Abdominal distention with vomiting and fever     TECHNIQUE:    Axial computed tomography images of the abdomen and pelvis without contrast.    All CT scans at this facility use at least one of these dose optimization   techniques: automated exposure control; mA and/or kV adjustment per patient   size (includes targeted exams where dose is matched to clinical indication); or   iterative reconstruction.    Coronal reformatted images were created and reviewed.     COMPARISON:    No relevant prior studies available.     FINDINGS:    Lower thorax:  Old granulomatous disease of lung(s). There are coronary artery   atheromatous calcifications, consistent with CAD. Dystrophic calcifications on   the mitral anulus. Normal heart size. Moderate right pleural effusion. Possible   tiny noncalcified 2 mm nodules versus vessels at lateral right lower lobe   base.( For patients at low risk (minimal or absent history of smoking and of   other known risk factors), no routine follow-up is indicated. For patients at   high risk (history of smoking or of other known risk factors), consider   optional CT at 12 months. (Magy et al., Fleischner Society, 2017).).    Scattered bibasilar subsegmental atelectasis. Mild RLL compressive atelectasis   and/or infiltrate. Some punctate high density possible debris or additional   calcifications in base of right  lower lobe. Small hiatal hernia.     ABDOMEN:    Liver:  Evidence of old granulomatous disease of the liver. No hepatomegaly.    Gallbladder and bile ducts: Unremarkable. No calcified stones. No ductal   dilation.    Pancreas:  Pancreatic atrophy.    Spleen: Vascular calcifications. No splenomegaly.    Adrenals:  Borderline in fullness of bilateral adrenals.    Kidneys and ureters:  Mild bilateral perinephric cobwebs/edema. Mild left   renal cortical scarring. No hydronephrosis or nephrolithiasis. No obstructive   uropathy.    Stomach and bowel:  Moderate fecal ball in distended rectum with mild wall   thickening bordered by mild edema, suspect mild stercoral colitis from mild   constipation. There is at least a moderate amount of colonic feces. Gas   scattered in nondilated small bowel. Nonspecific bowel gas. Moderate distention   of stomach containing air and fluid.    Appendix:  Appendix obscured.    Retroperitoneal space:  Oval 3.9 cm x 6.7 cm x 9.1 cm soft tissue density in   right retroperitoneum posterior to the right kidney-parapsoas region bordered   by hazy  density/edema could represent fluid collection such as hematoma or   abscess. No bubbles of air within this abnormal area. Possible slight swelling   of right psoas muscle. Ultrasound may be helpful to confirm suspected internal   fluid. Clinically correlate. Followup recommended.     PELVIS:    Bladder:  Subtotally contracted bladder restricts wall evaluation. No calculi.    Reproductive:  Enlarged prostate with transverse diameter of 5.7 cm.    Subperitoneal space:  Mild perirectal edema.     ABDOMEN and PELVIS:    Intraperitoneal space:  No significant ascites.    Bones/joints:  Mild scoliosis. Degenerative changes of the spine. DJD of   lateral hips. Mild DJD of bilateral sacroiliac joints. Chronic fracture L2   vertebral body.    Soft tissues:  Mild anasarca. Small 1.1 cm patchy or nodular density in   subcutaneous fat flank RLQ. Scattered areas  of mild skin thickening along the   anterior pelvis. Mild laxity of right flank abdominal wall. Mild anasarca.   Slightly more prominent edema or contusion in right flank subcutaneous fat.    Vasculature:  Atherosclerotic placques scattered along some vessels. No aortic   aneurysm. Lower abdominal aortic stent present.    Lymph nodes:  Calcified granulomata within mediastinal lymph node(s) from old   granulomatous disease.  No enlarged pelvic or abdominal lymph nodes.      Impression:       1. CAD.   2. Moderate right pleural effusion.   3. Bibasilar subsegmental atelectasis. Mild RLL compressive atelectasis and/or   infiltrate.   4. Constipation with mild rectal stercoral colitis.   5. Prostate enlargement.   6. Small hiatal hernia.   7. Pancreatic atrophy.   8. Nonspecific bowel gas pattern.   9. Right retroperitoneal 3.9 cm x 6.7 cm x 9.1 cm oval abnormality, possible   fluid collection such as hematoma or abscess. See above.   10. Mild anasarca. Slightly more prominent edema or contusion in right flank   subcutaneous fat.   11.  Suspect mild swelling of the right psoas muscle.  12.  Additional findings, as above.     THIS DOCUMENT HAS BEEN ELECTRONICALLY SIGNED BY HOLDEN BACON MD                          Order Current Status    Blood Culture - Blood, Arm, Left Preliminary result    Blood Culture - Blood, Arm, Right Preliminary result        Discharge Details        Discharge Medications      New Medications      Instructions Start Date   castor oil-balsam peru ointment   1 each, Topical, Every 12 Hours Scheduled      finasteride 5 MG tablet  Commonly known as:  PROSCAR   5 mg, Oral, Daily      fluconazole 200 MG tablet  Commonly known as:  DIFLUCAN   200 mg, Oral, Every 24 Hours      lactulose 10 GM/15ML solution  Commonly known as:  CHRONULAC   20 g, Oral, Daily PRN      levoFLOXacin 250 MG tablet  Commonly known as:  LEVAQUIN   250 mg, Oral, Every 24 Hours      polyethylene glycol packet  Commonly known as:   MIRALAX   17 g, Oral, 2 Times Daily      sennosides-docusate sodium 8.6-50 MG tablet  Commonly known as:  SENOKOT-S   2 tablets, Oral, Daily         Continue These Medications      Instructions Start Date   FERROUS SULFATE PO   Oral, Daily      hydrOXYzine 25 MG tablet  Commonly known as:  ATARAX   25 mg, Oral, 2 Times Daily      levothyroxine 50 MCG tablet  Commonly known as:  SYNTHROID, LEVOTHROID   50 mcg, Oral, Daily      Melatonin 10 MG tablet   Oral, Nightly      NON FORMULARY   PATIENT SPOUSE STATES THAT THE PATIENT IS ON INSULIN. SHE STATES THAT HE USED TO BE ON 70/30 BUT IS NOW ON SOMETHING ELSE BUT SHE IS UNABLE TO RECALL THE NAME OF IT.       PROBIOTIC PO   Oral      tamsulosin 0.4 MG capsule 24 hr capsule  Commonly known as:  FLOMAX   1 capsule, Oral, Nightly         Stop These Medications    metroNIDAZOLE 500 MG tablet  Commonly known as:  FLAGYL     NIFEdipine 20 MG capsule  Commonly known as:  PROCARDIA     QUETIAPINE FUMARATE PO     sulfamethoxazole-trimethoprim 800-160 MG per tablet  Commonly known as:  BACTRIM DS,SEPTRA DS            No Known Allergies      Discharge Disposition:  Home or Self Care    Discharge Diet:  Diet Order   Procedures   • Diet Regular; Consistent Carbohydrate         Discharge Activity: As tolerated      Special Instructions:  Take miralax and docusate/senna daily. For constipation take lactulose as needed as we discussed.      CODE STATUS:    Code Status and Medical Interventions:   Ordered at: 01/12/19 6087     Limited Support to NOT Include:    Intubation     Level Of Support Discussed With:    Patient     Code Status:    No CPR     Medical Interventions (Level of Support Prior to Arrest):    Limited         No future appointments.    Additional Instructions for the Follow-ups that You Need to Schedule     Discharge Follow-up with PCP   As directed       Currently Documented PCP:    Nydia Mejias MD    PCP Phone Number:    [phone]     Follow Up Details:  1  week hospital f/u         Discharge Follow-up with Specialty: JUAN Cabral; 2 Weeks   As directed      Specialty:  [facility] - [facility]    Follow Up:  2 Weeks         Discharge Follow-up with Specialty: urology; 2 Weeks   As directed      Specialty:  urology    Follow Up:  2 Weeks               Time Spent on Discharge:  50 minutes    Electronically signed by Trinidad Serrano II, DO, 01/17/19, 11:29 AM.

## 2019-01-17 NOTE — PLAN OF CARE
Problem: Fall Risk (Adult)  Goal: Absence of Fall  Outcome: Ongoing (interventions implemented as appropriate)   01/17/19 0508   Fall Risk (Adult)   Absence of Fall making progress toward outcome       Problem: Patient Care Overview  Goal: Plan of Care Review  Outcome: Ongoing (interventions implemented as appropriate)   01/17/19 0508   Coping/Psychosocial   Plan of Care Reviewed With patient   OTHER   Outcome Summary patient rested well from 0000 to present. mild confusion noted resolved with reorientation and reassurance. VSS. no new complaints noted.   Plan of Care Review   Progress improving       Problem: Skin Injury Risk (Adult)  Goal: Skin Health and Integrity  Outcome: Ongoing (interventions implemented as appropriate)   01/17/19 0508   Skin Injury Risk (Adult)   Skin Health and Integrity making progress toward outcome

## 2019-01-18 ENCOUNTER — READMISSION MANAGEMENT (OUTPATIENT)
Dept: CALL CENTER | Facility: HOSPITAL | Age: 84
End: 2019-01-18

## 2019-01-19 ENCOUNTER — READMISSION MANAGEMENT (OUTPATIENT)
Dept: CALL CENTER | Facility: HOSPITAL | Age: 84
End: 2019-01-19

## 2019-01-19 NOTE — OUTREACH NOTE
Prep Survey      Responses   Facility patient discharged from?  [place]   Is patient eligible?  Yes   Discharge diagnosis  Sepsis due to urinary tract infection    Does the patient have one of the following disease processes/diagnoses(primary or secondary)?  Sepsis   Does the patient have Home health ordered?  Yes   What is the Home health agency?   [facility]   Is there a DME ordered?  No   Prep survey completed?  Yes          Ely Redman RN

## 2019-01-19 NOTE — OUTREACH NOTE
Sepsis Week 1 Survey      Responses   Facility patient discharged from?  [place]   Does the patient have one of the following disease processes/diagnoses(primary or secondary)?  Sepsis   Is there a successful TCM telephone encounter documented?  No   Week 1 attempt successful?  No   Unsuccessful attempts  Attempt 1          Sfoi Anthony RN

## 2019-01-21 ENCOUNTER — READMISSION MANAGEMENT (OUTPATIENT)
Dept: CALL CENTER | Facility: HOSPITAL | Age: 84
End: 2019-01-21

## 2019-01-21 NOTE — OUTREACH NOTE
Sepsis Week 1 Survey      Responses   Facility patient discharged from?  [place]   Does the patient have one of the following disease processes/diagnoses(primary or secondary)?  Sepsis   Is there a successful TCM telephone encounter documented?  No   Week 1 attempt successful?  No   Unsuccessful attempts  Attempt 2          Molly Hobbs RN

## 2019-01-22 ENCOUNTER — READMISSION MANAGEMENT (OUTPATIENT)
Dept: CALL CENTER | Facility: HOSPITAL | Age: 84
End: 2019-01-22

## 2019-01-22 NOTE — OUTREACH NOTE
Sepsis Week 1 Survey      Responses   Facility patient discharged from?  [facility]   Does the patient have one of the following disease processes/diagnoses(primary or secondary)?  Sepsis   Is there a successful TCM telephone encounter documented?  No   Week 1 attempt successful?  Yes   Call start time  [id number]   Call end time  [id number]   Discharge diagnosis  Sepsis due to urinary tract infection    Is patient permission given to speak with other caregiver?  Yes   List who call center can speak with  Cate- Wife   Person spoke with today (if not patient) and relationship  Cate- Wife   Meds reviewed with patient/caregiver?  Yes   Is the patient having any side effects they believe may be caused by any medication additions or changes?  No   Does the patient have all medications related to this admission filled (includes all antibiotics, inhalers, nebulizers,steroids,etc.)  Yes   Is the patient taking all medications as directed (includes completed medication regime)?  Yes   Does the patient have a primary care provider?   Yes   Does the patient have an appointment with their PCP within 7 days of discharge?  Yes   Has the patient kept scheduled appointments due by today?  N/A   What is the Home health agency?   [facility]   Psychosocial issues?  No   Did the patient receive a copy of their discharge instructions?  Yes   Nursing interventions  Reviewed instructions with patient   What is the patient's perception of their health status since discharge?  Improving   Is the patient/caregiver able to teach back the hierarchy of who to call/visit for symptoms/problems? PCP, Specialist, Home health nurse, Urgent Care, ED, 911  Yes   Week 1 call completed?  Yes   Revoked  No further contact(revokes)-requires comment   Graduated/Revoked comments  Has palliative care at this time          Nicolette Singh RN